# Patient Record
Sex: FEMALE | Race: BLACK OR AFRICAN AMERICAN | NOT HISPANIC OR LATINO | Employment: UNEMPLOYED | URBAN - METROPOLITAN AREA
[De-identification: names, ages, dates, MRNs, and addresses within clinical notes are randomized per-mention and may not be internally consistent; named-entity substitution may affect disease eponyms.]

---
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
## 2018-12-05 PROBLEM — O26.899 ABDOMINAL CRAMPING AFFECTING PREGNANCY: Status: ACTIVE | Noted: 2018-12-05

## 2018-12-05 PROBLEM — O21.9 NAUSEA AND VOMITING DURING PREGNANCY: Status: ACTIVE | Noted: 2018-12-05

## 2018-12-05 PROBLEM — R10.9 ABDOMINAL CRAMPING AFFECTING PREGNANCY: Status: ACTIVE | Noted: 2018-12-05

## 2018-12-05 PROBLEM — R10.9 ABDOMINAL PAIN: Status: ACTIVE | Noted: 2018-12-05

## 2019-05-11 PROBLEM — M54.9 BACK PAIN AFFECTING PREGNANCY: Status: ACTIVE | Noted: 2019-05-11

## 2019-05-11 PROBLEM — O99.891 BACK PAIN AFFECTING PREGNANCY: Status: ACTIVE | Noted: 2019-05-11

## 2019-07-11 PROBLEM — O99.013 ANEMIA COMPLICATING PREGNANCY IN THIRD TRIMESTER: Status: ACTIVE | Noted: 2019-07-11

## 2019-07-27 PROBLEM — O47.1 FALSE LABOR AFTER 37 COMPLETED WEEKS OF GESTATION: Status: ACTIVE | Noted: 2019-07-27

## 2019-07-30 PROBLEM — Z98.891 STATUS POST PRIMARY LOW TRANSVERSE CESAREAN SECTION: Status: ACTIVE | Noted: 2019-07-29

## 2019-10-07 PROBLEM — N32.89 BLADDER SPASMS: Status: ACTIVE | Noted: 2019-10-07

## 2019-10-07 PROBLEM — N88.8 MASS OF CERVIX: Status: ACTIVE | Noted: 2019-10-07

## 2022-09-21 ENCOUNTER — HOSPITAL ENCOUNTER (INPATIENT)
Facility: HOSPITAL | Age: 22
LOS: 4 days | Discharge: HOME | DRG: 759 | End: 2022-09-25
Attending: FAMILY MEDICINE | Admitting: OBSTETRICS & GYNECOLOGY
Payer: MEDICAID

## 2022-09-21 DIAGNOSIS — E87.6 HYPOKALEMIA: ICD-10-CM

## 2022-09-21 DIAGNOSIS — N73.9 PELVIC INFLAMMATORY DISEASE (PID): ICD-10-CM

## 2022-09-21 DIAGNOSIS — N73.0 PID (ACUTE PELVIC INFLAMMATORY DISEASE): ICD-10-CM

## 2022-09-21 DIAGNOSIS — N70.93 TUBO-OVARIAN ABSCESS: Primary | ICD-10-CM

## 2022-09-21 LAB
ALBUMIN SERPL-MCNC: 4.1 GM/DL (ref 3.5–5)
ALBUMIN/GLOB SERPL: 1 RATIO (ref 1.1–2)
ALP SERPL-CCNC: 67 UNIT/L (ref 40–150)
ALT SERPL-CCNC: 5 UNIT/L (ref 0–55)
APPEARANCE UR: CLEAR
AST SERPL-CCNC: 11 UNIT/L (ref 5–34)
B-HCG UR QL: NEGATIVE
BACTERIA #/AREA URNS AUTO: ABNORMAL /HPF
BASOPHILS # BLD AUTO: 0.04 X10(3)/MCL (ref 0–0.2)
BASOPHILS NFR BLD AUTO: 0.1 %
BILIRUB UR QL STRIP.AUTO: NEGATIVE MG/DL
BILIRUBIN DIRECT+TOT PNL SERPL-MCNC: 1.3 MG/DL
BUN SERPL-MCNC: 4.4 MG/DL (ref 7–18.7)
C TRACH DNA SPEC QL NAA+PROBE: NOT DETECTED
CALCIUM SERPL-MCNC: 10.2 MG/DL (ref 8.4–10.2)
CHLORIDE SERPL-SCNC: 98 MMOL/L (ref 98–107)
CLUE CELLS VAG QL WET PREP: ABNORMAL
CO2 SERPL-SCNC: 19 MMOL/L (ref 22–29)
COLOR UR AUTO: YELLOW
CREAT SERPL-MCNC: 0.71 MG/DL (ref 0.55–1.02)
CTP QC/QA: YES
EOSINOPHIL # BLD AUTO: 0 X10(3)/MCL (ref 0–0.9)
EOSINOPHIL NFR BLD AUTO: 0 %
ERYTHROCYTE [DISTWIDTH] IN BLOOD BY AUTOMATED COUNT: 13.9 % (ref 11.5–17)
GFR SERPLBLD CREATININE-BSD FMLA CKD-EPI: >60 MLS/MIN/1.73/M2
GLOBULIN SER-MCNC: 4.3 GM/DL (ref 2.4–3.5)
GLUCOSE SERPL-MCNC: 108 MG/DL (ref 74–100)
GLUCOSE UR QL STRIP.AUTO: NORMAL MG/DL
HCT VFR BLD AUTO: 33.6 % (ref 37–47)
HGB BLD-MCNC: 10.9 GM/DL (ref 12–16)
HYALINE CASTS #/AREA URNS LPF: ABNORMAL /LPF
IMM GRANULOCYTES # BLD AUTO: 0.84 X10(3)/MCL (ref 0–0.04)
IMM GRANULOCYTES NFR BLD AUTO: 2.9 %
KETONES UR QL STRIP.AUTO: ABNORMAL MG/DL
LACTATE SERPL-SCNC: 0.7 MMOL/L (ref 0.5–2.2)
LACTATE SERPL-SCNC: 1.1 MMOL/L (ref 0.5–2.2)
LEUKOCYTE ESTERASE UR QL STRIP.AUTO: 25 UNIT/L
LIPASE SERPL-CCNC: 5 U/L
LYMPHOCYTES # BLD AUTO: 1.39 X10(3)/MCL (ref 0.6–4.6)
LYMPHOCYTES NFR BLD AUTO: 4.8 %
MAGNESIUM SERPL-MCNC: 1.8 MG/DL (ref 1.6–2.6)
MCH RBC QN AUTO: 29.6 PG (ref 27–31)
MCHC RBC AUTO-ENTMCNC: 32.4 MG/DL (ref 33–36)
MCV RBC AUTO: 91.3 FL (ref 80–94)
MONOCYTES # BLD AUTO: 2.12 X10(3)/MCL (ref 0.1–1.3)
MONOCYTES NFR BLD AUTO: 7.3 %
MUCOUS THREADS URNS QL MICRO: ABNORMAL /LPF
N GONORRHOEA DNA SPEC QL NAA+PROBE: NOT DETECTED
NEUTROPHILS # BLD AUTO: 24.7 X10(3)/MCL (ref 2.1–9.2)
NEUTROPHILS NFR BLD AUTO: 84.9 %
NITRITE UR QL STRIP.AUTO: NEGATIVE
NRBC BLD AUTO-RTO: 0 %
PH UR STRIP.AUTO: 6 [PH]
PHOSPHATE SERPL-MCNC: 3.3 MG/DL (ref 2.3–4.7)
PLATELET # BLD AUTO: 276 X10(3)/MCL (ref 130–400)
PLATELET # BLD EST: ADEQUATE 10*3/UL
PLATELETS.RETICULATED NFR BLD AUTO: 9.7 % (ref 0.9–11.2)
PMV BLD AUTO: 12.4 FL (ref 7.4–10.4)
POTASSIUM SERPL-SCNC: 2.7 MMOL/L (ref 3.5–5.1)
PROT SERPL-MCNC: 8.4 GM/DL (ref 6.4–8.3)
PROT UR QL STRIP.AUTO: ABNORMAL MG/DL
RBC # BLD AUTO: 3.68 X10(6)/MCL (ref 4.2–5.4)
RBC #/AREA URNS AUTO: ABNORMAL /HPF
RBC MORPH BLD: NORMAL
RBC UR QL AUTO: NEGATIVE UNIT/L
SARS-COV-2 RDRP RESP QL NAA+PROBE: NEGATIVE
SODIUM SERPL-SCNC: 135 MMOL/L (ref 136–145)
SP GR UR STRIP.AUTO: 1.02
SQUAMOUS #/AREA URNS LPF: ABNORMAL /HPF
T VAGINALIS VAG QL WET PREP: ABNORMAL
UROBILINOGEN UR STRIP-ACNC: ABNORMAL MG/DL
WBC # SPEC AUTO: 29.1 X10(3)/MCL (ref 4.5–11.5)
WBC #/AREA URNS AUTO: ABNORMAL /HPF
WBC #/AREA VAG WET PREP: ABNORMAL
YEAST SPEC QL WET PREP: ABNORMAL

## 2022-09-21 PROCEDURE — 99285 EMERGENCY DEPT VISIT HI MDM: CPT | Mod: 25

## 2022-09-21 PROCEDURE — 96361 HYDRATE IV INFUSION ADD-ON: CPT

## 2022-09-21 PROCEDURE — 87491 CHLMYD TRACH DNA AMP PROBE: CPT | Performed by: FAMILY MEDICINE

## 2022-09-21 PROCEDURE — 87591 N.GONORRHOEAE DNA AMP PROB: CPT | Performed by: FAMILY MEDICINE

## 2022-09-21 PROCEDURE — 25000003 PHARM REV CODE 250: Performed by: STUDENT IN AN ORGANIZED HEALTH CARE EDUCATION/TRAINING PROGRAM

## 2022-09-21 PROCEDURE — 63600175 PHARM REV CODE 636 W HCPCS: Performed by: STUDENT IN AN ORGANIZED HEALTH CARE EDUCATION/TRAINING PROGRAM

## 2022-09-21 PROCEDURE — 36415 COLL VENOUS BLD VENIPUNCTURE: CPT | Performed by: FAMILY MEDICINE

## 2022-09-21 PROCEDURE — 63600175 PHARM REV CODE 636 W HCPCS: Performed by: FAMILY MEDICINE

## 2022-09-21 PROCEDURE — 85025 COMPLETE CBC W/AUTO DIFF WBC: CPT | Performed by: FAMILY MEDICINE

## 2022-09-21 PROCEDURE — 87040 BLOOD CULTURE FOR BACTERIA: CPT | Performed by: FAMILY MEDICINE

## 2022-09-21 PROCEDURE — 96365 THER/PROPH/DIAG IV INF INIT: CPT

## 2022-09-21 PROCEDURE — 83690 ASSAY OF LIPASE: CPT | Performed by: FAMILY MEDICINE

## 2022-09-21 PROCEDURE — 25000003 PHARM REV CODE 250: Performed by: FAMILY MEDICINE

## 2022-09-21 PROCEDURE — 87040 BLOOD CULTURE FOR BACTERIA: CPT | Performed by: STUDENT IN AN ORGANIZED HEALTH CARE EDUCATION/TRAINING PROGRAM

## 2022-09-21 PROCEDURE — 87635 SARS-COV-2 COVID-19 AMP PRB: CPT | Performed by: STUDENT IN AN ORGANIZED HEALTH CARE EDUCATION/TRAINING PROGRAM

## 2022-09-21 PROCEDURE — 21400001 HC TELEMETRY ROOM

## 2022-09-21 PROCEDURE — 83735 ASSAY OF MAGNESIUM: CPT | Performed by: FAMILY MEDICINE

## 2022-09-21 PROCEDURE — 25500020 PHARM REV CODE 255: Performed by: FAMILY MEDICINE

## 2022-09-21 PROCEDURE — 80053 COMPREHEN METABOLIC PANEL: CPT | Performed by: FAMILY MEDICINE

## 2022-09-21 PROCEDURE — 81025 URINE PREGNANCY TEST: CPT | Performed by: FAMILY MEDICINE

## 2022-09-21 PROCEDURE — 11000001 HC ACUTE MED/SURG PRIVATE ROOM

## 2022-09-21 PROCEDURE — 83605 ASSAY OF LACTIC ACID: CPT | Performed by: STUDENT IN AN ORGANIZED HEALTH CARE EDUCATION/TRAINING PROGRAM

## 2022-09-21 PROCEDURE — 87210 SMEAR WET MOUNT SALINE/INK: CPT | Performed by: FAMILY MEDICINE

## 2022-09-21 PROCEDURE — 81001 URINALYSIS AUTO W/SCOPE: CPT | Performed by: FAMILY MEDICINE

## 2022-09-21 PROCEDURE — 96375 TX/PRO/DX INJ NEW DRUG ADDON: CPT

## 2022-09-21 PROCEDURE — 84100 ASSAY OF PHOSPHORUS: CPT | Performed by: STUDENT IN AN ORGANIZED HEALTH CARE EDUCATION/TRAINING PROGRAM

## 2022-09-21 RX ORDER — METRONIDAZOLE 500 MG/1
500 TABLET ORAL EVERY 12 HOURS
Status: DISCONTINUED | OUTPATIENT
Start: 2022-09-21 | End: 2022-09-25 | Stop reason: HOSPADM

## 2022-09-21 RX ORDER — ACETAMINOPHEN 500 MG
1000 TABLET ORAL
Status: COMPLETED | OUTPATIENT
Start: 2022-09-21 | End: 2022-09-21

## 2022-09-21 RX ORDER — LANOLIN ALCOHOL/MO/W.PET/CERES
800 CREAM (GRAM) TOPICAL
Status: DISCONTINUED | OUTPATIENT
Start: 2022-09-21 | End: 2022-09-25 | Stop reason: HOSPADM

## 2022-09-21 RX ORDER — SODIUM CHLORIDE 0.9 % (FLUSH) 0.9 %
10 SYRINGE (ML) INJECTION
Status: DISCONTINUED | OUTPATIENT
Start: 2022-09-21 | End: 2022-09-25 | Stop reason: HOSPADM

## 2022-09-21 RX ORDER — POTASSIUM CHLORIDE 20 MEQ/1
40 TABLET, EXTENDED RELEASE ORAL
Status: COMPLETED | OUTPATIENT
Start: 2022-09-21 | End: 2022-09-21

## 2022-09-21 RX ORDER — ACETAMINOPHEN 325 MG/1
650 TABLET ORAL EVERY 6 HOURS
Status: DISCONTINUED | OUTPATIENT
Start: 2022-09-21 | End: 2022-09-21

## 2022-09-21 RX ORDER — KETOROLAC TROMETHAMINE 30 MG/ML
30 INJECTION, SOLUTION INTRAMUSCULAR; INTRAVENOUS
Status: COMPLETED | OUTPATIENT
Start: 2022-09-21 | End: 2022-09-21

## 2022-09-21 RX ORDER — KETOROLAC TROMETHAMINE 30 MG/ML
30 INJECTION, SOLUTION INTRAMUSCULAR; INTRAVENOUS EVERY 6 HOURS
Status: COMPLETED | OUTPATIENT
Start: 2022-09-21 | End: 2022-09-22

## 2022-09-21 RX ORDER — PROMETHAZINE HYDROCHLORIDE 25 MG/1
25 TABLET ORAL EVERY 6 HOURS PRN
Status: DISCONTINUED | OUTPATIENT
Start: 2022-09-21 | End: 2022-09-25 | Stop reason: HOSPADM

## 2022-09-21 RX ORDER — IBUPROFEN 200 MG
24 TABLET ORAL
Status: DISCONTINUED | OUTPATIENT
Start: 2022-09-21 | End: 2022-09-25 | Stop reason: HOSPADM

## 2022-09-21 RX ORDER — OXYCODONE HYDROCHLORIDE 5 MG/1
5 TABLET ORAL EVERY 6 HOURS PRN
Status: DISCONTINUED | OUTPATIENT
Start: 2022-09-21 | End: 2022-09-25 | Stop reason: HOSPADM

## 2022-09-21 RX ORDER — ONDANSETRON 4 MG/1
8 TABLET, ORALLY DISINTEGRATING ORAL EVERY 8 HOURS PRN
Status: DISCONTINUED | OUTPATIENT
Start: 2022-09-21 | End: 2022-09-25 | Stop reason: HOSPADM

## 2022-09-21 RX ORDER — POTASSIUM CHLORIDE 20 MEQ/1
40 TABLET, EXTENDED RELEASE ORAL 2 TIMES DAILY
Status: DISCONTINUED | OUTPATIENT
Start: 2022-09-21 | End: 2022-09-24

## 2022-09-21 RX ORDER — GLUCAGON 1 MG
1 KIT INJECTION
Status: DISCONTINUED | OUTPATIENT
Start: 2022-09-21 | End: 2022-09-25 | Stop reason: HOSPADM

## 2022-09-21 RX ORDER — IBUPROFEN 200 MG
16 TABLET ORAL
Status: DISCONTINUED | OUTPATIENT
Start: 2022-09-21 | End: 2022-09-25 | Stop reason: HOSPADM

## 2022-09-21 RX ORDER — OXYCODONE HYDROCHLORIDE 5 MG/1
10 TABLET ORAL EVERY 6 HOURS PRN
Status: DISCONTINUED | OUTPATIENT
Start: 2022-09-21 | End: 2022-09-25 | Stop reason: HOSPADM

## 2022-09-21 RX ORDER — SODIUM CHLORIDE, SODIUM LACTATE, POTASSIUM CHLORIDE, CALCIUM CHLORIDE 600; 310; 30; 20 MG/100ML; MG/100ML; MG/100ML; MG/100ML
INJECTION, SOLUTION INTRAVENOUS CONTINUOUS
Status: DISCONTINUED | OUTPATIENT
Start: 2022-09-21 | End: 2022-09-24

## 2022-09-21 RX ORDER — ONDANSETRON 2 MG/ML
4 INJECTION INTRAMUSCULAR; INTRAVENOUS
Status: COMPLETED | OUTPATIENT
Start: 2022-09-21 | End: 2022-09-21

## 2022-09-21 RX ORDER — POTASSIUM CHLORIDE 20 MEQ/1
40 TABLET, EXTENDED RELEASE ORAL 2 TIMES DAILY
Status: DISCONTINUED | OUTPATIENT
Start: 2022-09-21 | End: 2022-09-21

## 2022-09-21 RX ORDER — PROCHLORPERAZINE EDISYLATE 5 MG/ML
5 INJECTION INTRAMUSCULAR; INTRAVENOUS EVERY 6 HOURS PRN
Status: DISCONTINUED | OUTPATIENT
Start: 2022-09-21 | End: 2022-09-21

## 2022-09-21 RX ORDER — ACETAMINOPHEN 325 MG/1
650 TABLET ORAL EVERY 4 HOURS PRN
Status: DISCONTINUED | OUTPATIENT
Start: 2022-09-21 | End: 2022-09-25 | Stop reason: HOSPADM

## 2022-09-21 RX ORDER — POLYETHYLENE GLYCOL 3350 17 G/17G
17 POWDER, FOR SOLUTION ORAL DAILY
Status: DISCONTINUED | OUTPATIENT
Start: 2022-09-21 | End: 2022-09-25 | Stop reason: HOSPADM

## 2022-09-21 RX ORDER — ONDANSETRON 4 MG/1
8 TABLET, ORALLY DISINTEGRATING ORAL EVERY 8 HOURS PRN
Status: DISCONTINUED | OUTPATIENT
Start: 2022-09-21 | End: 2022-09-21

## 2022-09-21 RX ORDER — IBUPROFEN 600 MG/1
600 TABLET ORAL EVERY 6 HOURS
Status: DISCONTINUED | OUTPATIENT
Start: 2022-09-22 | End: 2022-09-25 | Stop reason: HOSPADM

## 2022-09-21 RX ADMIN — ACETAMINOPHEN 1000 MG: 500 TABLET ORAL at 10:09

## 2022-09-21 RX ADMIN — CEFTRIAXONE SODIUM 1 G: 1 INJECTION, POWDER, FOR SOLUTION INTRAMUSCULAR; INTRAVENOUS at 08:09

## 2022-09-21 RX ADMIN — SODIUM CHLORIDE 1000 ML: 9 INJECTION, SOLUTION INTRAVENOUS at 03:09

## 2022-09-21 RX ADMIN — KETOROLAC TROMETHAMINE 30 MG: 30 INJECTION, SOLUTION INTRAMUSCULAR; INTRAVENOUS at 11:09

## 2022-09-21 RX ADMIN — KETOROLAC TROMETHAMINE 30 MG: 30 INJECTION, SOLUTION INTRAMUSCULAR; INTRAVENOUS at 03:09

## 2022-09-21 RX ADMIN — SODIUM CHLORIDE, POTASSIUM CHLORIDE, SODIUM LACTATE AND CALCIUM CHLORIDE: 600; 310; 30; 20 INJECTION, SOLUTION INTRAVENOUS at 12:09

## 2022-09-21 RX ADMIN — KETOROLAC TROMETHAMINE 30 MG: 30 INJECTION, SOLUTION INTRAMUSCULAR; INTRAVENOUS at 05:09

## 2022-09-21 RX ADMIN — POTASSIUM CHLORIDE 40 MEQ: 1500 TABLET, EXTENDED RELEASE ORAL at 06:09

## 2022-09-21 RX ADMIN — ONDANSETRON 8 MG: 4 TABLET, ORALLY DISINTEGRATING ORAL at 10:09

## 2022-09-21 RX ADMIN — METRONIDAZOLE 500 MG: 500 TABLET ORAL at 09:09

## 2022-09-21 RX ADMIN — SODIUM CHLORIDE, POTASSIUM CHLORIDE, SODIUM LACTATE AND CALCIUM CHLORIDE 1632 ML: 600; 310; 30; 20 INJECTION, SOLUTION INTRAVENOUS at 11:09

## 2022-09-21 RX ADMIN — IOHEXOL 100 ML: 350 INJECTION, SOLUTION INTRAVENOUS at 05:09

## 2022-09-21 RX ADMIN — PIPERACILLIN AND TAZOBACTAM 4.5 G: 4; .5 INJECTION, POWDER, LYOPHILIZED, FOR SOLUTION INTRAVENOUS; PARENTERAL at 06:09

## 2022-09-21 RX ADMIN — POTASSIUM CHLORIDE 40 MEQ: 1500 TABLET, EXTENDED RELEASE ORAL at 04:09

## 2022-09-21 RX ADMIN — SODIUM CHLORIDE, POTASSIUM CHLORIDE, SODIUM LACTATE AND CALCIUM CHLORIDE 1632 ML: 600; 310; 30; 20 INJECTION, SOLUTION INTRAVENOUS at 09:09

## 2022-09-21 RX ADMIN — POLYETHYLENE GLYCOL 3350 17 G: 17 POWDER, FOR SOLUTION ORAL at 09:09

## 2022-09-21 RX ADMIN — VANCOMYCIN HYDROCHLORIDE 1000 MG: 1 INJECTION, POWDER, LYOPHILIZED, FOR SOLUTION INTRAVENOUS at 11:09

## 2022-09-21 RX ADMIN — ONDANSETRON 4 MG: 2 INJECTION INTRAMUSCULAR; INTRAVENOUS at 03:09

## 2022-09-21 RX ADMIN — PIPERACILLIN AND TAZOBACTAM 4.5 G: 4; .5 INJECTION, POWDER, LYOPHILIZED, FOR SOLUTION INTRAVENOUS; PARENTERAL at 10:09

## 2022-09-21 RX ADMIN — VANCOMYCIN HYDROCHLORIDE 750 MG: 750 INJECTION, POWDER, LYOPHILIZED, FOR SOLUTION INTRAVENOUS at 11:09

## 2022-09-21 NOTE — PROGRESS NOTES
MD to bedside for PM rounds.    Pt doing well, pain improved, denies n/v, fevers or chills. Tolerating PO     Vitals:    09/21/22 1546   BP:    Pulse: 97   Resp:    Temp:        Gen: NAD  Lungs: normal resp effort  Abdomen: soft, mild ttp in bilateral lower quadrants, no rebound or guarding  Ext: calves nontender    A/P:  Beatriz Arceo is a 22 y.o. female admitted for  PID/left TOA. Currently on IV antibiotics    PID/TOA:   - wet prep significant for trichomonas, plan to start PO flagyl for treatment   -T last 100.4F at approx. 1130. Has been afebrile since that time. Tylenol prn for fever  -Continue IV vanc/zosyn   - lactic acid wnl, blood cultures pending   - NPO after midnight in the event of IR consult in AM and potential IR drainage of TOA.       Hypokalemia: repleting with Jean Carlos, will f/u AM CBC    Dionte Stewart MD   [facility], PGY4

## 2022-09-21 NOTE — PROGRESS NOTES
Pharmacokinetic Initial Assessment: IV Vancomycin    Assessment/Plan:    Initiate intravenous vancomycin with loading dose of 1000 mg once followed by a maintenance dose of vancomycin 750mg IV every 12 hours  Desired empiric serum trough concentration is 10 to 20 mcg/mL  Draw vancomycin random level on 09/22/22 at 1100.  Pharmacy will continue to follow and monitor vancomycin.      Please contact pharmacy at extension [phone] with any questions regarding this assessment.     Thank you for the consult,   Sasha Do       Patient brief summary:  Beatriz Arceo is a 22 y.o. female initiated on antimicrobial therapy with IV Vancomycin for treatment of suspected intra-abdominal infection    Drug Allergies:   Review of patient's allergies indicates:  No Known Allergies    Actual Body Weight:   54.4kg    Renal Function:   Estimated Creatinine Clearance: 87.8 mL/min (based on SCr of 0.71 mg/dL).,     CBC (last 72 hours):  Recent Labs   Lab Result Units 09/21/22  0409   WBC x10(3)/mcL 29.1*   Hgb gm/dL 10.9*   Hct % 33.6*   Platelet x10(3)/mcL 276   Mono % % 7.3   Eos % % 0.0   Basophil % % 0.1       Metabolic Panel (last 72 hours):  Recent Labs   Lab Result Units 09/21/22  0329 09/21/22  0409 09/21/22  0529   Sodium Level mmol/L  --  135*  --    Potassium Level mmol/L  --  2.7*  --    Chloride mmol/L  --  98  --    Carbon Dioxide mmol/L  --  19*  --    Glucose Level mg/dL  --  108*  --    Glucose, UA mg/dL Normal  --   --    Blood Urea Nitrogen mg/dL  --  4.4*  --    Creatinine mg/dL  --  0.71  --    Albumin Level gm/dL  --  4.1  --    Bilirubin Total mg/dL  --  1.3  --    Alkaline Phosphatase unit/L  --  67  --    Aspartate Aminotransferase unit/L  --  11  --    Alanine Aminotransferase unit/L  --  5  --    Magnesium Level mg/dL  --   --  1.80     Microbiologic Results:  Microbiology Results (last 7 days)       Procedure Component Value Units Date/Time    Blood Culture [[id number]] Collected: 09/21/22 0957    Order  Status: Sent Specimen: Blood Updated: 09/21/22 1013    Blood Culture [[id number]]     Order Status: Sent Specimen: Blood     Chlamydia/GC, PCR [[id number]]  (Normal) Collected: 09/21/22 0329    Order Status: Completed Specimen: Urine Updated: 09/21/22 0918     Chlamydia trachomatis PCR Not Detected     N. gonorrhea PCR Not Detected    Blood Culture [[id number]] Collected: 09/21/22 0817    Order Status: Sent Specimen: Blood Updated: 09/21/22 0822    Blood Culture [[id number]] Collected: 09/21/22 0817    Order Status: Sent Specimen: Blood Updated: 09/21/22 0822    Wet Prep, Genital [[id number]]  (Abnormal) Collected: 09/21/22 0741    Order Status: Completed Specimen: Genital from Cervicovaginal Updated: 09/21/22 0751     WBC, Wet Prep Many     Clue Cells, Wet Prep None Seen     Trichomonas, Wet Prep Few     Yeast, Wet Prep None Seen    Trichomonas Prep Wet Mount [[id number]]     Order Status: Canceled Specimen: Vagina

## 2022-09-21 NOTE — CONSULTS
[facility] Consult H&P     Subjective:      History of Present Illness:  Beatriz Arceo is a 22 y.o.  presenting to ED with 4 days N/V and abdominal pain, constipation. Pt states pain is sharp, located at bilateral lower abdominal quadrants, worse when getting up after being seated for long time. Denies CP, SOB, fevers or chills. Denies vaginal bleeding or abnormal discharge.        Review of Systems:  Pertinent items are noted in HPI. All other systems are reviewed and are negative.    Past Medical History:  Past Medical History:   Diagnosis Date    Cancer     Cervical cancer        Past Surgical History:  Past Surgical History:   Procedure Laterality Date     SECTION N/A 2019    Procedure:  SECTION;  Surgeon: Fred Reynoso MD;  Location: [facility] OR;  Service: OB/GYN;  Laterality: N/A;    froze cervical cancer         Obstetrical History:  OB History    Para Term  AB Living   2 2 2     2   SAB IAB Ectopic Multiple Live Births           2      # Outcome Date GA Lbr Corey/2nd Weight Sex Delivery Anes PTL Lv   2 Term 17 39w3d  3.45 kg (7 lb 9.7 oz) F CS-LTranv EPI N JESUS      Complications: Chorioamnionitis, Failed induction of labor   1 Term        Y JESUS       Gynecologic History:   LMP 2022  Menarche: 11/r/3-5  STD history: remote hx trichomonas  Pap smear history: hx cryotherapy x2 2 years ago, does not recall pathology    Allergies:  Review of patient's allergies indicates:  No Known Allergies    Medications:   In-Hospital Scheduled Medications:   cefTRIAXone (ROCEPHIN) IVPB  1 g Intravenous ED 1 Time      In-Hospital PRN Medications:     In-Hospital IV Infusion Medications:     Home Medications:  Prior to Admission medications    Medication Sig Start Date End Date Taking? Authorizing Provider   diclofenac (VOLTAREN) 75 MG EC tablet Take 1 tablet (75 mg total) by mouth 2 (two) times daily as needed (Pain). 10/7/19   Abel Ahn NP    HYDROcodone-acetaminophen (NORCO) 5-325 mg per tablet Take 1 tablet by mouth every 6 (six) hours as needed for Pain. 10/7/19   Abel Ahn NP   hyoscyamine (LEVSIN/SL) 0.125 mg Subl Place 1 tablet (0.125 mg total) under the tongue every 4 (four) hours as needed (Abdominal cramping/bladder spasms). 10/7/19   Abel Ahn NP       Family History:  History reviewed. No pertinent family history.    Social History:  Social History     Tobacco Use    Smoking status: Former    Smokeless tobacco: Never   Substance Use Topics    Alcohol use: No    Drug use: No        Objective:   Last 24 Hour Vital Signs:  BP  Min: 124/71  Max: 131/77  Temp  Av.7 °F (37.6 °C)  Min: 99.7 °F (37.6 °C)  Max: 99.7 °F (37.6 °C)  Pulse  Av.3  Min: 95  Max: 106  Resp  Av.5  Min: 17  Max: 18  SpO2  Av %  Min: 98 %  Max: 100 %  I/O last 3 completed shifts:  In: 1000 [IV Piggyback:1000]  Out: -   There is no height or weight on file to calculate BMI.    Physical Examination:  Vitals:    22 0302 22 0530 22 0600 22 0630   BP:  124/71 129/74 131/77   Pulse: 106 99 95 101   Resp: 18 18 17 17   Temp: 99.7 °F (37.6 °C)      TempSrc: Oral      SpO2: 98% 99% 100% 99%       There is no height or weight on file to calculate BMI.    Gen: Alert, cooperative, no distress, appears stated age  CV: RRR  Chest: CTABL, no wheezes/rales/rhonchi  Abdomen: Soft, mild TTP b/l lower quadrants, no masses  Extrem: Extremities normal, atraumatic, no cyanosis or edema.  No calf tenderness or erythema.  External genitalia: Normal female genetalia without lesion, discharge or tenderness.  Speculum Exam: Vaginal vault with scant white discharge, nonodorous, no lesions/masses seen.  Cervical os visualized as parous, closed, no lesions/masses.  Bimanual Exam: No cervical motion tenderness.  Uterus freely mobile.  6-7 week size uterus. No adnexal masses.  TTP right and left adnexa, fundus    Of note; per ED physician pelvic exam  prior to this MD notable for copious foul smelling yellow/green discharge cleared with 4 proctoswabs    Laboratory Results:  Lab Results   Component Value Date    WBC 29.1 (H) 09/21/2022    HGB 10.9 (L) 09/21/2022    HCT 33.6 (L) 09/21/2022    MCV 91.3 09/21/2022     09/21/2022         Radiology:  TVUS 9/21/22:   FINDINGS:  The uterus measures 12.5 x 4.2 x 5.3 cm. The endometrial stripe is 1 cm.     The right ovary is only seen transabdominally. It measures 4.1 x 2 x 2.9 cm. There is a cyst present measuring 1.8 x 1.2 x 2 cm. There is flow present.     There are nabothian cyst present.  There is a small amount of free fluid in the cul de sac.     The left ovary measures 7.3 x 4.6 x 6.2 cm..  This is felt to represent both the ovary and a complex fluid collection adjacent to the ovary.  The 2 are very difficult to separate.  This is most consistent with a hemorrhagic or infected cyst.  An endometrioma cannot be ruled out with certainty.  The flow is hyperemic.     Impression:     Complex lesion adjacent to the left ovary.  Differential would include hemorrhagic and infected cyst.  Endometrioma cannot be ruled out with certainty.  TOA should be considered.       CT Abdomen Pelvis With Contrast    Result Date: 9/21/2022  START OF REPORT: Technique: CT of the abdomen and pelvis was performed with axial images as well as sagittal and coronal reconstruction images with intravenous contrast. Comparison: Comparison is with study dated â2019-10-07 01:49:28â. Clinical History: Abdominal Pain Vomiting Nausea. Dosage Information: Automated Exposure Control was utilized. Findings: Lines and Tubes: None. Thorax: Lungs: The visualized lung bases appear unremarkable. Pleura: No effusions or thickening. Heart: The heart size is within normal limits. Abdomen: Abdominal Wall: No abdominal wall pathology is seen. Liver: The liver appears unremarkable. Biliary System: No intrahepatic or extrahepatic biliary duct dilatation is  seen. Gallbladder: The gallbladder appears unremarkable. Pancreas: The pancreas appears unremarkable. Spleen: The spleen appears unremarkable. Adrenals: The adrenal glands appear unremarkable. Kidneys: The kidneys appear unremarkable with no stones cysts masses or hydronephrosis. Aorta: The abdominal aorta appears unremarkable. IVC: Unremarkable. Bowel: Esophagus: The visualized esophagus appears unremarkable. Stomach: The stomach appears unremarkable. Duodenum: Unremarkable appearing duodenum. Small Bowel: The small bowel appears unremarkable. Colon: The wall of rectum seems thick (series 607 image 67). Consider proctitis. Correlation and follow up advised as clinically indicated. Appendix: The appendix is not identified due to overlapping by the bowel loops but no inflammatory changes are seen in the right lower quadrant to suggest appendicitis. Peritoneum: Mild free fluid is seen in the pelvis. Pelvis: Bladder: The bladder is nondistended however the bladder wall appears thickened after considering state of nondistension which could reflect an element of cystitis. Female: Uterus: The cervix seems irregularly thick. Visual inspection advised. Ovaries: There are complex cystic masses in bilateral adnexa measuring 3.0 x 3.0 cm on right and 7.0 x 4.7 cm on left.Mild surrounding peripelvic fat stranding and free fluid in the pelvis. Bilateral ovaries are not seperately visualised. There may be elements of thickened small bowel insinuated into the left adnexa. Prominent parametrial veins are noted bilaterally. Considerations include endometriomas, and neoplasm, less likely tubo-ovarian abscesses at this age. Suggest pelvic ultrasound evaluation. Bony structures: Dorsal Spine: The visualized dorsal spine appears unremarkable. Bony Pelvis: The visualized bony structures of the pelvis appear unremarkable. Impression: 1. There are complex cystic masses in bilateral adnexa measuring 3.0 x 3.0 cm on right and 7.0 x 4.7 cm on  left.Mild surrounding peripelvic fat stranding and free fluid in the pelvis. Bilateral ovaries are not seperately visualised. There may be elements of thickened small bowel insinuated into the left adnexa. Prominent parametrial veins are noted bilaterally. Considerations include endometriomas, and neoplasm, less likely tubo-ovarian abscesses at this age. Suggest pelvic ultrasound evaluation. 2. The cervix seems irregularly thick. Visual inspection advised. 3. The bladder is nondistended however the bladder wall appears thickened after considering state of nondistension which could reflect an element of cystitis. 4. The wall of rectum seems thick (series 607 image 67). Consider proctitis. Correlation and follow up advised as clinically indicated. 5. Details and other findings as discussed above.        Assessment/Plan:     Beatriz Arceo is a 22 y.o. female with 7cm left adnexal mass, b/l adnexal tenderness and copious malodorous discharge concerning for PID/TOA. Leukocytosis at 29.   -PID/TOA:   - will treat with vanc/zosyn given intraabdominal infection  - Consult IR for possible drainage if no improvement on IV abx  - sepsis protocol ordered; will follow up lactate, blood, urine cultures  - continue IV fluid hydration with LR @ 125ml/hr  - regular diet now, low threshold for NPO  - if no clinical improvement or worsens, IR consult; if cannot be drained plan for dx lap with washout       Discussed with staff, Dr. Provost Magi Mcelroy MD, MPH  [facility], PGY3

## 2022-09-21 NOTE — ED PROVIDER NOTES
Encounter Date: 2022       History     Chief Complaint   Patient presents with    Abdominal Pain    Vomiting    Nausea     22-year-old female presents emergency room with complaints of lower abdominal pain for the last 4 days.  Patient thought that it was initially menstrual cramps, but then felt as if she was constipated.  No bowel movement the last 4 days.  Reports associated nausea and vomiting.  Patient denies dysuria or hematuria.  Denies vaginal bleeding.  Reports last menstrual cycle was approximately a month ago, but does not believe she was pregnant.  History of cervical cancer in the past.  Symptoms currently severe, worse with certain movements, nothing makes better.    The history is provided by the patient.   Review of patient's allergies indicates:  No Known Allergies  Past Medical History:   Diagnosis Date    Cancer     Cervical cancer      Past Surgical History:   Procedure Laterality Date     SECTION N/A 2019    Procedure:  SECTION;  Surgeon: Fred Reynoso MD;  Location: [facility];  Service: OB/GYN;  Laterality: N/A;    froze cervical cancer       History reviewed. No pertinent family history.  Social History     Tobacco Use    Smoking status: Former    Smokeless tobacco: Never   Substance Use Topics    Alcohol use: No    Drug use: No     Review of Systems   Constitutional:  Negative for chills, fatigue and fever.   HENT:  Negative for ear pain, rhinorrhea and sore throat.    Eyes:  Negative for photophobia and pain.   Respiratory:  Negative for cough, shortness of breath and wheezing.    Cardiovascular:  Negative for chest pain.   Gastrointestinal:  Positive for abdominal pain, constipation, nausea and vomiting. Negative for diarrhea.   Genitourinary:  Negative for dysuria.   Neurological:  Negative for dizziness, weakness and headaches.   All other systems reviewed and are negative.    Physical Exam     Initial Vitals   BP Pulse Resp Temp SpO2   22 0530  09/21/22 0302 09/21/22 0302 09/21/22 0302 09/21/22 0302   124/71 106 18 99.7 °F (37.6 °C) 98 %      MAP       --                Physical Exam    Nursing note and vitals reviewed.  Constitutional: She appears well-developed and well-nourished. No distress.   Appears uncomfortable due to pain   HENT:   Head: Normocephalic and atraumatic.   Eyes: Conjunctivae and EOM are normal. Pupils are equal, round, and reactive to light.   Neck: Neck supple.   Normal range of motion.  Cardiovascular:  Normal rate, regular rhythm, normal heart sounds and intact distal pulses.           Pulmonary/Chest: Breath sounds normal. No respiratory distress. She has no wheezes. She has no rhonchi. She has no rales.   Abdominal: Abdomen is soft. Bowel sounds are normal. She exhibits no distension. There is abdominal tenderness.   Moderate generalized abdominal tenderness without rebound or guarding; abdomen slightly distended. There is no rebound and no guarding.   Musculoskeletal:         General: Normal range of motion.      Cervical back: Normal range of motion and neck supple.     Neurological: She is alert and oriented to person, place, and time.   Skin: Skin is warm and dry. Capillary refill takes less than 2 seconds. No erythema.   Psychiatric: She has a normal mood and affect. Her behavior is normal. Judgment and thought content normal.       ED Course   Procedures  Labs Reviewed   COMPREHENSIVE METABOLIC PANEL - Abnormal; Notable for the following components:       Result Value    Sodium Level 135 (*)     Potassium Level 2.7 (*)     Carbon Dioxide 19 (*)     Glucose Level 108 (*)     Blood Urea Nitrogen 4.4 (*)     Protein Total 8.4 (*)     Globulin 4.3 (*)     Albumin/Globulin Ratio 1.0 (*)     All other components within normal limits   URINALYSIS, REFLEX TO URINE CULTURE - Abnormal; Notable for the following components:    Protein, UA 1+ (*)     Ketones, UA 4+ (*)     Urobilinogen, UA 2+ (*)     Leukocyte Esterase, UA 25 (*)      Bacteria, UA Few (*)     Squamous Epithelial Cells, UA Few (*)     Mucous, UA Occ (*)     Hyaline Casts, UA 0-2 (*)     All other components within normal limits   CBC WITH DIFFERENTIAL - Abnormal; Notable for the following components:    WBC 29.1 (*)     RBC 3.68 (*)     Hgb 10.9 (*)     Hct 33.6 (*)     MCHC 32.4 (*)     MPV 12.4 (*)     Neut # 24.7 (*)     Mono # 2.12 (*)     IG# 0.84 (*)     All other components within normal limits    Narrative:     This is an appended report.  These results have been appended to a previously verified report.   LIPASE - Normal   BLOOD SMEAR MICROSCOPIC EXAM ([facility]) - Normal   MAGNESIUM - Normal   CHLAMYDIA/GONORRHOEAE(GC), PCR   CBC W/ AUTO DIFFERENTIAL    Narrative:     The following orders were created for panel order CBC Auto Differential.  Procedure                               Abnormality         Status                     ---------                               -----------         ------                     CBC with Differential[[id number]]        Abnormal            Final result                 Please view results for these tests on the individual orders.   POCT URINE PREGNANCY          Imaging Results              CT Abdomen Pelvis With Contrast (Preliminary result)  Result time 09/21/22 05:25:13      Preliminary result by Derik Fontana Jr., MD (09/21/22 05:25:13)                   Narrative:    START OF REPORT:  Technique: CT of the abdomen and pelvis was performed with axial images as well as sagittal and coronal reconstruction images with intravenous contrast.    Comparison: Comparison is with study dated â2019-10-07 01:49:28â.    Clinical History: Abdominal Pain Vomiting Nausea.    Dosage Information: Automated Exposure Control was utilized.    Findings:  Lines and Tubes: None.  Thorax:  Lungs: The visualized lung bases appear unremarkable.  Pleura: No effusions or thickening.  Heart: The heart size is within normal limits.  Abdomen:  Abdominal Wall: No  abdominal wall pathology is seen.  Liver: The liver appears unremarkable.  Biliary System: No intrahepatic or extrahepatic biliary duct dilatation is seen.  Gallbladder: The gallbladder appears unremarkable.  Pancreas: The pancreas appears unremarkable.  Spleen: The spleen appears unremarkable.  Adrenals: The adrenal glands appear unremarkable.  Kidneys: The kidneys appear unremarkable with no stones cysts masses or hydronephrosis.  Aorta: The abdominal aorta appears unremarkable.  IVC: Unremarkable.  Bowel:  Esophagus: The visualized esophagus appears unremarkable.  Stomach: The stomach appears unremarkable.  Duodenum: Unremarkable appearing duodenum.  Small Bowel: The small bowel appears unremarkable.  Colon: The wall of rectum seems thick (series 607 image 67). Consider proctitis. Correlation and follow up advised as clinically indicated.  Appendix: The appendix is not identified due to overlapping by the bowel loops but no inflammatory changes are seen in the right lower quadrant to suggest appendicitis.  Peritoneum: Mild free fluid is seen in the pelvis.    Pelvis:  Bladder: The bladder is nondistended however the bladder wall appears thickened after considering state of nondistension which could reflect an element of cystitis.  Female:  Uterus: The cervix seems irregularly thick. Visual inspection advised.  Ovaries: There are complex cystic masses in bilateral adnexa measuring 3.0 x 3.0 cm on right and 7.0 x 4.7 cm on left.Mild surrounding peripelvic fat stranding and free fluid in the pelvis. Bilateral ovaries are not seperately visualised. There may be elements of thickened small bowel insinuated into the left adnexa. Prominent parametrial veins are noted bilaterally. Considerations include endometriomas, and neoplasm, less likely tubo-ovarian abscesses at this age. Suggest pelvic ultrasound evaluation.    Bony structures:  Dorsal Spine: The visualized dorsal spine appears unremarkable.  Bony Pelvis: The  visualized bony structures of the pelvis appear unremarkable.      Impression:  1. There are complex cystic masses in bilateral adnexa measuring 3.0 x 3.0 cm on right and 7.0 x 4.7 cm on left.Mild surrounding peripelvic fat stranding and free fluid in the pelvis. Bilateral ovaries are not seperately visualised. There may be elements of thickened small bowel insinuated into the left adnexa. Prominent parametrial veins are noted bilaterally. Considerations include endometriomas, and neoplasm, less likely tubo-ovarian abscesses at this age. Suggest pelvic ultrasound evaluation.  2. The cervix seems irregularly thick. Visual inspection advised.  3. The bladder is nondistended however the bladder wall appears thickened after considering state of nondistension which could reflect an element of cystitis.  4. The wall of rectum seems thick (series 607 image 67). Consider proctitis. Correlation and follow up advised as clinically indicated.  5. Details and other findings as discussed above.                          Preliminary result by Interface, Rad Results In (09/21/22 05:25:13)                   Narrative:    START OF REPORT:  Technique: CT of the abdomen and pelvis was performed with axial images as well as sagittal and coronal reconstruction images with intravenous contrast.    Comparison: Comparison is with study dated â2019-10-07 01:49:28â.    Clinical History: Abdominal Pain Vomiting Nausea.    Dosage Information: Automated Exposure Control was utilized.    Findings:  Lines and Tubes: None.  Thorax:  Lungs: The visualized lung bases appear unremarkable.  Pleura: No effusions or thickening.  Heart: The heart size is within normal limits.  Abdomen:  Abdominal Wall: No abdominal wall pathology is seen.  Liver: The liver appears unremarkable.  Biliary System: No intrahepatic or extrahepatic biliary duct dilatation is seen.  Gallbladder: The gallbladder appears unremarkable.  Pancreas: The pancreas appears  unremarkable.  Spleen: The spleen appears unremarkable.  Adrenals: The adrenal glands appear unremarkable.  Kidneys: The kidneys appear unremarkable with no stones cysts masses or hydronephrosis.  Aorta: The abdominal aorta appears unremarkable.  IVC: Unremarkable.  Bowel:  Esophagus: The visualized esophagus appears unremarkable.  Stomach: The stomach appears unremarkable.  Duodenum: Unremarkable appearing duodenum.  Small Bowel: The small bowel appears unremarkable.  Colon: The wall of rectum seems thick (series 607 image 67). Consider proctitis. Correlation and follow up advised as clinically indicated.  Appendix: The appendix is not identified due to overlapping by the bowel loops but no inflammatory changes are seen in the right lower quadrant to suggest appendicitis.  Peritoneum: Mild free fluid is seen in the pelvis.    Pelvis:  Bladder: The bladder is nondistended however the bladder wall appears thickened after considering state of nondistension which could reflect an element of cystitis.  Female:  Uterus: The cervix seems irregularly thick. Visual inspection advised.  Ovaries: There are complex cystic masses in bilateral adnexa measuring 3.0 x 3.0 cm on right and 7.0 x 4.7 cm on left.Mild surrounding peripelvic fat stranding and free fluid in the pelvis. Bilateral ovaries are not seperately visualised. There may be elements of thickened small bowel insinuated into the left adnexa. Prominent parametrial veins are noted bilaterally. Considerations include endometriomas, and neoplasm, less likely tubo-ovarian abscesses at this age. Suggest pelvic ultrasound evaluation.    Bony structures:  Dorsal Spine: The visualized dorsal spine appears unremarkable.  Bony Pelvis: The visualized bony structures of the pelvis appear unremarkable.      Impression:  1. There are complex cystic masses in bilateral adnexa measuring 3.0 x 3.0 cm on right and 7.0 x 4.7 cm on left.Mild surrounding peripelvic fat stranding and free  fluid in the pelvis. Bilateral ovaries are not seperately visualised. There may be elements of thickened small bowel insinuated into the left adnexa. Prominent parametrial veins are noted bilaterally. Considerations include endometriomas, and neoplasm, less likely tubo-ovarian abscesses at this age. Suggest pelvic ultrasound evaluation.  2. The cervix seems irregularly thick. Visual inspection advised.  3. The bladder is nondistended however the bladder wall appears thickened after considering state of nondistension which could reflect an element of cystitis.  4. The wall of rectum seems thick (series 607 image 67). Consider proctitis. Correlation and follow up advised as clinically indicated.  5. Details and other findings as discussed above.                                         Medications   cefTRIAXone (ROCEPHIN) 1 g in sodium chloride 0.9 % 50 mL IVPB (MB+) (has no administration in time range)   ondansetron injection 4 mg (4 mg Intravenous Given 9/21/22 0329)   ketorolac injection 30 mg (30 mg Intravenous Given 9/21/22 0329)   sodium chloride 0.9% bolus 1,000 mL (0 mLs Intravenous Stopped 9/21/22 0513)   iohexoL (OMNIPAQUE 350) injection 100 mL (100 mLs Intravenous Given 9/21/22 0527)   potassium chloride SA CR tablet 40 mEq (40 mEq Oral Given 9/21/22 0633)                 ED Course as of 09/21/22 0704   Wed Sep 21, 2022   0429 Feeling improved since receiving Toradol.  Awaiting on laboratory evaluation. [MW]   0441 WBC(!): 29.1 [MW]   0441 Hemoglobin(!): 10.9 [MW]   0441 Hematocrit(!): 33.6 [MW]   0441 Platelets: 276 [MW]   0441 Color, UA: Yellow [MW]   0441 Appearance, UA: Clear [MW]   0441 Leukocytes, UA(!): 25 [MW]   0441 NITRITE UA: Negative [MW]   0441 WBC, UA: 0-5 [MW]   0441 Bacteria, UA(!): Few [MW]   0442 Lipase: 5 [MW]   0528 Sodium(!): 135 [MW]   0528 Potassium(!!): 2.7 [MW]   0528 Chloride: 98 [MW]   0528 BUN(!): 4.4 [MW]   0528 Creatinine: 0.71 [MW]   0528 Albumin: 4.1 [MW]   0528 Globulin,  Total(!): 4.3 [MW]   0621 Alkaline Phosphatase: 67 [MW]   0623 On CT evaluation, patient noted to have complex cystic masses in bilateral adnexal regions measuring 3 x 3 cm on the right and 7 x 4.7 cm on the left with mild surrounding pelvic fat stranding and free fluid in the pelvis.  Ovaries were not separately visualize, and reported elements of thickened small bowel in sign UA did into the left adnexa.  Differential diagnosis includes endometriomas versus neoplasm, and possibly to ovarian abscess.  Pelvic ultrasound has been ordered, and will discuss case with gyn for evaluation. [MW]   0639 Due to concerns of potential tubo-ovarian abscess, gyn has been notified the patient is in the emergency room and consulted.  Currently an ultrasound is ordered for further evaluation.  Nurse currently in the process of getting the patient set up for pelvic examination. [MW]   0653 On pelvic examination, significant purulent discharge from cervical os. [MW]   0704 Patient transitioned to Dr. Baker. [MW]      ED Course User Index  [MW] Zach Flores MD                   Clinical Impression:   Final diagnoses:  [N73.0] PID (acute pelvic inflammatory disease) (Primary)  [E87.6] Hypokalemia             Zach Flores MD  09/21/22 0704

## 2022-09-21 NOTE — ED PROVIDER NOTES
Transvaginal ultrasound concerning for complex lesion in the left ovary that could be concerning for hemorrhagic cyst versus infected cyst versus endometrioma versus TOA.      This was discussed with the Gynecology team.  They came and evaluated the patient will admit primarily.     Giles Baker MD  09/21/22 0955

## 2022-09-21 NOTE — H&P
[facility]  H&P      Subjective:      History of Present Illness:  Beatriz Arceo is a 22 y.o.  presenting to ED with 4 days N/V and abdominal pain, constipation. Pt states pain is sharp, located at bilateral lower abdominal quadrants, worse when getting up after being seated for long time. Denies CP, SOB, fevers or chills. Denies vaginal bleeding or abnormal discharge.         Review of Systems:  Pertinent items are noted in HPI. All other systems are reviewed and are negative.     Past Medical History:       Past Medical History:   Diagnosis Date    Cancer      Cervical cancer           Past Surgical History:        Past Surgical History:   Procedure Laterality Date     SECTION N/A 2019     Procedure:  SECTION;  Surgeon: Fred Reynoso MD;  Location: [facility] OR;  Service: OB/GYN;  Laterality: N/A;    froze cervical cancer             Obstetrical History:                   OB History    Para Term  AB Living   2 2 2     2   SAB IAB Ectopic Multiple Live Births              2          # Outcome Date GA Lbr Corey/2nd Weight Sex Delivery Anes PTL Lv   2 Term 19 39w3d   3.45 kg (7 lb 9.7 oz) F CS-LTranv EPI N JESUS      Complications: Chorioamnionitis, Failed induction of labor   1 Term               Y JESUS         Gynecologic History:   LMP 2022  Menarche: r/3-5  STD history: remote hx trichomonas  Pap smear history: hx cryotherapy x2 2 years ago, does not recall pathology     Allergies:  Review of patient's allergies indicates:  No Known Allergies     Medications:   In-Hospital Scheduled Medications:   cefTRIAXone (ROCEPHIN) IVPB  1 g Intravenous ED 1 Time       In-Hospital PRN Medications:      In-Hospital IV Infusion Medications:      Home Medications:          Prior to Admission medications    Medication Sig Start Date End Date Taking? Authorizing Provider   diclofenac (VOLTAREN) 75 MG EC tablet Take 1 tablet (75 mg total) by mouth 2 (two) times daily as  needed (Pain). 10/7/19     Abel Ahn NP   HYDROcodone-acetaminophen (NORCO) 5-325 mg per tablet Take 1 tablet by mouth every 6 (six) hours as needed for Pain. 10/7/19     Abel Ahn NP   hyoscyamine (LEVSIN/SL) 0.125 mg Subl Place 1 tablet (0.125 mg total) under the tongue every 4 (four) hours as needed (Abdominal cramping/bladder spasms). 10/7/19     Abel Ahn NP         Family History:  History reviewed. No pertinent family history.     Social History:  Social History           Tobacco Use    Smoking status: Former    Smokeless tobacco: Never   Substance Use Topics    Alcohol use: No    Drug use: No         Objective:   Last 24 Hour Vital Signs:  BP  Min: 124/71  Max: 131/77  Temp  Av.7 °F (37.6 °C)  Min: 99.7 °F (37.6 °C)  Max: 99.7 °F (37.6 °C)  Pulse  Av.3  Min: 95  Max: 106  Resp  Av.5  Min: 17  Max: 18  SpO2  Av %  Min: 98 %  Max: 100 %  I/O last 3 completed shifts:  In: 1000 [IV Piggyback:1000]  Out: -   There is no height or weight on file to calculate BMI.     Physical Examination:  Vitals          Vitals:     22 0302 22 0530 22 0600 22 0630   BP:   124/71 129/74 131/77   Pulse: 106 99 95 101   Resp: 18 18 17 17   Temp: 99.7 °F (37.6 °C)         TempSrc: Oral         SpO2: 98% 99% 100% 99%            There is no height or weight on file to calculate BMI.     Gen: Alert, cooperative, no distress, appears stated age  CV: RRR  Chest: CTABL, no wheezes/rales/rhonchi  Abdomen: Soft, mild TTP b/l lower quadrants, no masses  Extrem: Extremities normal, atraumatic, no cyanosis or edema.  No calf tenderness or erythema.  External genitalia: Normal female genetalia without lesion, discharge or tenderness.  Speculum Exam: Vaginal vault with scant white discharge, nonodorous, no lesions/masses seen.  Cervical os visualized as parous, closed, no lesions/masses.  Bimanual Exam: No cervical motion tenderness.  Uterus freely mobile.  6-7 week size uterus. No  adnexal masses.  TTP right and left adnexa, fundus     Of note; per ED physician pelvic exam prior to this MD notable for copious foul smelling yellow/green discharge cleared with 4 proctoswabs     Laboratory Results:        Lab Results   Component Value Date     WBC 29.1 (H) 09/21/2022     HGB 10.9 (L) 09/21/2022     HCT 33.6 (L) 09/21/2022     MCV 91.3 09/21/2022      09/21/2022            Radiology:  TVUS 9/21/22:   FINDINGS:  The uterus measures 12.5 x 4.2 x 5.3 cm. The endometrial stripe is 1 cm.     The right ovary is only seen transabdominally. It measures 4.1 x 2 x 2.9 cm. There is a cyst present measuring 1.8 x 1.2 x 2 cm. There is flow present.     There are nabothian cyst present.  There is a small amount of free fluid in the cul de sac.     The left ovary measures 7.3 x 4.6 x 6.2 cm..  This is felt to represent both the ovary and a complex fluid collection adjacent to the ovary.  The 2 are very difficult to separate.  This is most consistent with a hemorrhagic or infected cyst.  An endometrioma cannot be ruled out with certainty.  The flow is hyperemic.     Impression:     Complex lesion adjacent to the left ovary.  Differential would include hemorrhagic and infected cyst.  Endometrioma cannot be ruled out with certainty.  TOA should be considered.        CT Abdomen Pelvis With Contrast     Result Date: 9/21/2022  START OF REPORT: Technique: CT of the abdomen and pelvis was performed with axial images as well as sagittal and coronal reconstruction images with intravenous contrast. Comparison: Comparison is with study dated â2019-10-07 01:49:28â. Clinical History: Abdominal Pain Vomiting Nausea. Dosage Information: Automated Exposure Control was utilized. Findings: Lines and Tubes: None. Thorax: Lungs: The visualized lung bases appear unremarkable. Pleura: No effusions or thickening. Heart: The heart size is within normal limits. Abdomen: Abdominal Wall: No abdominal wall pathology is seen.  Liver: The liver appears unremarkable. Biliary System: No intrahepatic or extrahepatic biliary duct dilatation is seen. Gallbladder: The gallbladder appears unremarkable. Pancreas: The pancreas appears unremarkable. Spleen: The spleen appears unremarkable. Adrenals: The adrenal glands appear unremarkable. Kidneys: The kidneys appear unremarkable with no stones cysts masses or hydronephrosis. Aorta: The abdominal aorta appears unremarkable. IVC: Unremarkable. Bowel: Esophagus: The visualized esophagus appears unremarkable. Stomach: The stomach appears unremarkable. Duodenum: Unremarkable appearing duodenum. Small Bowel: The small bowel appears unremarkable. Colon: The wall of rectum seems thick (series 607 image 67). Consider proctitis. Correlation and follow up advised as clinically indicated. Appendix: The appendix is not identified due to overlapping by the bowel loops but no inflammatory changes are seen in the right lower quadrant to suggest appendicitis. Peritoneum: Mild free fluid is seen in the pelvis. Pelvis: Bladder: The bladder is nondistended however the bladder wall appears thickened after considering state of nondistension which could reflect an element of cystitis. Female: Uterus: The cervix seems irregularly thick. Visual inspection advised. Ovaries: There are complex cystic masses in bilateral adnexa measuring 3.0 x 3.0 cm on right and 7.0 x 4.7 cm on left.Mild surrounding peripelvic fat stranding and free fluid in the pelvis. Bilateral ovaries are not seperately visualised. There may be elements of thickened small bowel insinuated into the left adnexa. Prominent parametrial veins are noted bilaterally. Considerations include endometriomas, and neoplasm, less likely tubo-ovarian abscesses at this age. Suggest pelvic ultrasound evaluation. Bony structures: Dorsal Spine: The visualized dorsal spine appears unremarkable. Bony Pelvis: The visualized bony structures of the pelvis appear unremarkable.  Impression: 1. There are complex cystic masses in bilateral adnexa measuring 3.0 x 3.0 cm on right and 7.0 x 4.7 cm on left.Mild surrounding peripelvic fat stranding and free fluid in the pelvis. Bilateral ovaries are not seperately visualised. There may be elements of thickened small bowel insinuated into the left adnexa. Prominent parametrial veins are noted bilaterally. Considerations include endometriomas, and neoplasm, less likely tubo-ovarian abscesses at this age. Suggest pelvic ultrasound evaluation. 2. The cervix seems irregularly thick. Visual inspection advised. 3. The bladder is nondistended however the bladder wall appears thickened after considering state of nondistension which could reflect an element of cystitis. 4. The wall of rectum seems thick (series 607 image 67). Consider proctitis. Correlation and follow up advised as clinically indicated. 5. Details and other findings as discussed above.         Assessment/Plan:      Beatriz Arceo is a 22 y.o. female with 7cm left adnexal mass, b/l adnexal tenderness and copious malodorous discharge concerning for PID/TOA. Leukocytosis at 27.   -PID/TOA:   - will treat with vanc/zosyn given intraabdominal infection  - Consult IR for possible drainage if no improvement on IV abx  - sepsis protocol ordered; will follow up lactate, blood, urine cultures  - continue IV fluid hydration with LR @ 125ml/hr  - regular diet now, low threshold for NPO  - if no clinical improvement or worsens, IR consult; if cannot be drained plan for dx lap with washout        Discussed with staff, Dr. Provost Magi Mcelroy MD, MPH  [facility], PGY3

## 2022-09-22 LAB
ABS NEUT CALC (OHS): 19.96 X10(3)/MCL (ref 2.1–9.2)
ALBUMIN SERPL-MCNC: 2.5 GM/DL (ref 3.5–5)
ALBUMIN/GLOB SERPL: 0.8 RATIO (ref 1.1–2)
ALP SERPL-CCNC: 54 UNIT/L (ref 40–150)
ALT SERPL-CCNC: 6 UNIT/L (ref 0–55)
ANISOCYTOSIS BLD QL SMEAR: ABNORMAL
AST SERPL-CCNC: 10 UNIT/L (ref 5–34)
BILIRUBIN DIRECT+TOT PNL SERPL-MCNC: 0.7 MG/DL
BUN SERPL-MCNC: 5 MG/DL (ref 7–18.7)
CALCIUM SERPL-MCNC: 8.9 MG/DL (ref 8.4–10.2)
CHLORIDE SERPL-SCNC: 108 MMOL/L (ref 98–107)
CO2 SERPL-SCNC: 20 MMOL/L (ref 22–29)
CREAT SERPL-MCNC: 0.65 MG/DL (ref 0.55–1.02)
ERYTHROCYTE [DISTWIDTH] IN BLOOD BY AUTOMATED COUNT: 14.2 % (ref 11.5–17)
GFR SERPLBLD CREATININE-BSD FMLA CKD-EPI: >60 MLS/MIN/1.73/M2
GLOBULIN SER-MCNC: 3.2 GM/DL (ref 2.4–3.5)
GLUCOSE SERPL-MCNC: 101 MG/DL (ref 74–100)
HCT VFR BLD AUTO: 27.8 % (ref 37–47)
HGB BLD-MCNC: 8.6 GM/DL (ref 12–16)
IMM GRANULOCYTES # BLD AUTO: 0.75 X10(3)/MCL (ref 0–0.04)
IMM GRANULOCYTES NFR BLD AUTO: 3.4 %
LYMPHOCYTES NFR BLD MANUAL: 1.3 X10(3)/MCL
LYMPHOCYTES NFR BLD MANUAL: 6 % (ref 13–40)
MAGNESIUM SERPL-MCNC: 1.5 MG/DL (ref 1.6–2.6)
MCH RBC QN AUTO: 28.8 PG (ref 27–31)
MCHC RBC AUTO-ENTMCNC: 30.9 MG/DL (ref 33–36)
MCV RBC AUTO: 93 FL (ref 80–94)
MONOCYTES NFR BLD MANUAL: 0.43 X10(3)/MCL (ref 0.1–1.3)
MONOCYTES NFR BLD MANUAL: 2 % (ref 2–11)
NEUTROPHILS NFR BLD MANUAL: 92 % (ref 47–80)
NRBC BLD AUTO-RTO: 0 %
PHOSPHATE SERPL-MCNC: 2.6 MG/DL (ref 2.3–4.7)
PLATELET # BLD AUTO: 224 X10(3)/MCL (ref 130–400)
PLATELET # BLD EST: NORMAL 10*3/UL
PMV BLD AUTO: 11.7 FL (ref 7.4–10.4)
POLYCHROMASIA BLD QL SMEAR: ABNORMAL
POTASSIUM SERPL-SCNC: 3.5 MMOL/L (ref 3.5–5.1)
PROT SERPL-MCNC: 5.7 GM/DL (ref 6.4–8.3)
RBC # BLD AUTO: 2.99 X10(6)/MCL (ref 4.2–5.4)
RBC MORPH BLD: ABNORMAL
SODIUM SERPL-SCNC: 139 MMOL/L (ref 136–145)
VANCOMYCIN SERPL-MCNC: 3.8 UG/ML (ref 15–20)
WBC # SPEC AUTO: 21.7 X10(3)/MCL (ref 4.5–11.5)

## 2022-09-22 PROCEDURE — 63600175 PHARM REV CODE 636 W HCPCS: Performed by: STUDENT IN AN ORGANIZED HEALTH CARE EDUCATION/TRAINING PROGRAM

## 2022-09-22 PROCEDURE — A4216 STERILE WATER/SALINE, 10 ML: HCPCS | Performed by: STUDENT IN AN ORGANIZED HEALTH CARE EDUCATION/TRAINING PROGRAM

## 2022-09-22 PROCEDURE — 80202 ASSAY OF VANCOMYCIN: CPT | Performed by: STUDENT IN AN ORGANIZED HEALTH CARE EDUCATION/TRAINING PROGRAM

## 2022-09-22 PROCEDURE — 85027 COMPLETE CBC AUTOMATED: CPT | Performed by: STUDENT IN AN ORGANIZED HEALTH CARE EDUCATION/TRAINING PROGRAM

## 2022-09-22 PROCEDURE — 25000003 PHARM REV CODE 250: Performed by: STUDENT IN AN ORGANIZED HEALTH CARE EDUCATION/TRAINING PROGRAM

## 2022-09-22 PROCEDURE — 36415 COLL VENOUS BLD VENIPUNCTURE: CPT | Performed by: STUDENT IN AN ORGANIZED HEALTH CARE EDUCATION/TRAINING PROGRAM

## 2022-09-22 PROCEDURE — 36410 VNPNXR 3YR/> PHY/QHP DX/THER: CPT

## 2022-09-22 PROCEDURE — 80053 COMPREHEN METABOLIC PANEL: CPT | Performed by: STUDENT IN AN ORGANIZED HEALTH CARE EDUCATION/TRAINING PROGRAM

## 2022-09-22 PROCEDURE — 94761 N-INVAS EAR/PLS OXIMETRY MLT: CPT

## 2022-09-22 PROCEDURE — 84100 ASSAY OF PHOSPHORUS: CPT | Performed by: STUDENT IN AN ORGANIZED HEALTH CARE EDUCATION/TRAINING PROGRAM

## 2022-09-22 PROCEDURE — C1751 CATH, INF, PER/CENT/MIDLINE: HCPCS

## 2022-09-22 PROCEDURE — 21400001 HC TELEMETRY ROOM

## 2022-09-22 PROCEDURE — 83735 ASSAY OF MAGNESIUM: CPT | Performed by: STUDENT IN AN ORGANIZED HEALTH CARE EDUCATION/TRAINING PROGRAM

## 2022-09-22 RX ORDER — SODIUM CHLORIDE 0.9 % (FLUSH) 0.9 %
10 SYRINGE (ML) INJECTION EVERY 6 HOURS
Status: DISCONTINUED | OUTPATIENT
Start: 2022-09-22 | End: 2022-09-25 | Stop reason: HOSPADM

## 2022-09-22 RX ORDER — SODIUM CHLORIDE 0.9 % (FLUSH) 0.9 %
10 SYRINGE (ML) INJECTION
Status: DISCONTINUED | OUTPATIENT
Start: 2022-09-22 | End: 2022-09-25 | Stop reason: HOSPADM

## 2022-09-22 RX ORDER — MAGNESIUM SULFATE HEPTAHYDRATE 40 MG/ML
2 INJECTION, SOLUTION INTRAVENOUS ONCE
Status: COMPLETED | OUTPATIENT
Start: 2022-09-22 | End: 2022-09-22

## 2022-09-22 RX ADMIN — SODIUM CHLORIDE, POTASSIUM CHLORIDE, SODIUM LACTATE AND CALCIUM CHLORIDE: 600; 310; 30; 20 INJECTION, SOLUTION INTRAVENOUS at 01:09

## 2022-09-22 RX ADMIN — METRONIDAZOLE 500 MG: 500 TABLET ORAL at 08:09

## 2022-09-22 RX ADMIN — SODIUM CHLORIDE, PRESERVATIVE FREE 10 ML: 5 INJECTION INTRAVENOUS at 06:09

## 2022-09-22 RX ADMIN — PIPERACILLIN AND TAZOBACTAM 4.5 G: 4; .5 INJECTION, POWDER, LYOPHILIZED, FOR SOLUTION INTRAVENOUS; PARENTERAL at 01:09

## 2022-09-22 RX ADMIN — VANCOMYCIN HYDROCHLORIDE 750 MG: 750 INJECTION, POWDER, LYOPHILIZED, FOR SOLUTION INTRAVENOUS at 08:09

## 2022-09-22 RX ADMIN — PIPERACILLIN AND TAZOBACTAM 4.5 G: 4; .5 INJECTION, POWDER, LYOPHILIZED, FOR SOLUTION INTRAVENOUS; PARENTERAL at 03:09

## 2022-09-22 RX ADMIN — MAGNESIUM SULFATE 2 G: 2 INJECTION INTRAVENOUS at 10:09

## 2022-09-22 RX ADMIN — IBUPROFEN 600 MG: 600 TABLET ORAL at 11:09

## 2022-09-22 RX ADMIN — POTASSIUM CHLORIDE 40 MEQ: 1500 TABLET, EXTENDED RELEASE ORAL at 08:09

## 2022-09-22 RX ADMIN — IBUPROFEN 600 MG: 600 TABLET ORAL at 03:09

## 2022-09-22 RX ADMIN — VANCOMYCIN HYDROCHLORIDE 750 MG: 750 INJECTION, POWDER, LYOPHILIZED, FOR SOLUTION INTRAVENOUS at 01:09

## 2022-09-22 RX ADMIN — SODIUM CHLORIDE, PRESERVATIVE FREE 10 ML: 5 INJECTION INTRAVENOUS at 12:09

## 2022-09-22 RX ADMIN — POLYETHYLENE GLYCOL 3350 17 G: 17 POWDER, FOR SOLUTION ORAL at 08:09

## 2022-09-22 RX ADMIN — KETOROLAC TROMETHAMINE 30 MG: 30 INJECTION, SOLUTION INTRAMUSCULAR; INTRAVENOUS at 05:09

## 2022-09-22 RX ADMIN — ACETAMINOPHEN 650 MG: 325 TABLET ORAL at 04:09

## 2022-09-22 NOTE — PLAN OF CARE
09/22/22 1009   Discharge Assessment   Assessment Type Discharge Planning Assessment   Confirmed/corrected address, phone number and insurance Yes   Confirmed Demographics Correct on Facesheet   Source of Information patient   Reason For Admission Pelvic Inflammatory Disease   Lives With sibling(s);parent(s)   Do you expect to return to your current living situation? Yes   Do you have help at home or someone to help you manage your care at home? Yes   Who are your caregiver(s) and their phone number(s)? kobi Michael, [phone]   Prior to hospitilization cognitive status: No Deficits;Alert/Oriented   Current cognitive status: No Deficits;Alert/Oriented   Walking or Climbing Stairs Difficulty none   Dressing/Bathing Difficulty none   Home Layout Able to live on 1st floor   Equipment Currently Used at Home none   Readmission within 30 days? No   Patient currently being followed by outpatient case management? No   Do you currently have service(s) that help you manage your care at home? No   Do you take prescription medications? No   Who is going to help you get home at discharge? TBD   Are you on dialysis? No   Do you take coumadin? No   Discharge Plan A Home   DME Needed Upon Discharge  none   Discharge Plan discussed with: Patient   Discharge Barriers Identified None

## 2022-09-22 NOTE — PROGRESS NOTES
Pharmacokinetic Assessment Follow Up: IV Vancomycin    Vancomycin serum concentration assessment(s):    The random level was drawn correctly and can be used to guide therapy at this time. The measurement is below the desired definitive target range of 10 to 20 mcg/mL.    Vancomycin Regimen Plan:    Change regimen to Vancomycin 750 mg IV every 8 hours with next serum trough concentration measured at 60 mins prior to 0400 dose on 09/23/22    Drug levels (last 3 results):  Recent Labs   Lab Result Units 09/22/22  1104   Vanc Lvl Random ug/ml 3.8*       Pharmacy will continue to follow and monitor vancomycin.    Please contact pharmacy at extension [phone] for questions regarding this assessment.    Thank you for the consult,   Sasha Do       Patient brief summary:  Beatriz Arceo is a 22 y.o. female initiated on antimicrobial therapy with IV Vancomycin for treatment of intra-abdominal infection    The patient's current regimen is 750mg q8h    Drug Allergies:   Review of patient's allergies indicates:  No Known Allergies    Actual Body Weight:   52.2kg    Renal Function:   Estimated Creatinine Clearance: 95.9 mL/min (based on SCr of 0.65 mg/dL).,         CBC (last 72 hours):  Recent Labs   Lab Result Units 09/21/22  0409 09/22/22  0427   WBC x10(3)/mcL 29.1* 21.7*   Hgb gm/dL 10.9* 8.6*   Hct % 33.6* 27.8*   Platelet x10(3)/mcL 276 224   Mono % % 7.3  --    Monocyte Man %  --  2   Eos % % 0.0  --    Basophil % % 0.1  --        Metabolic Panel (last 72 hours):  Recent Labs   Lab Result Units 09/21/22  0329 09/21/22  0409 09/21/22  0529 09/22/22  0427   Sodium Level mmol/L  --  135*  --  139   Potassium Level mmol/L  --  2.7*  --  3.5   Chloride mmol/L  --  98  --  108*   Carbon Dioxide mmol/L  --  19*  --  20*   Glucose Level mg/dL  --  108*  --  101*   Glucose, UA mg/dL Normal  --   --   --    Blood Urea Nitrogen mg/dL  --  4.4*  --  5.0*   Creatinine mg/dL  --  0.71  --  0.65   Albumin Level gm/dL  --  4.1  --   2.5*   Bilirubin Total mg/dL  --  1.3  --  0.7   Alkaline Phosphatase unit/L  --  67  --  54   Aspartate Aminotransferase unit/L  --  11  --  10   Alanine Aminotransferase unit/L  --  5  --  6   Magnesium Level mg/dL  --   --  1.80 1.50*   Phosphorus Level mg/dL  --   --  3.3 2.6       Vancomycin Administrations:  vancomycin given in the last 96 hours                     vancomycin 750 mg in sodium chloride 0.9% 250 mL IVPB (mg) 750 mg New Bag 09/21/22 2333    vancomycin (VANCOCIN) 1,000 mg in sodium chloride 0.9% 250 mL IVPB (mg) 1,000 mg New Bag 09/21/22 1119                    Microbiologic Results:  Microbiology Results (last 7 days)       Procedure Component Value Units Date/Time    Blood Culture [[id number]] Collected: 09/21/22 0957    Order Status: Resulted Specimen: Blood Updated: 09/21/22 1013    Blood Culture [[id number]]     Order Status: Canceled Specimen: Blood     Chlamydia/GC, PCR [[id number]]  (Normal) Collected: 09/21/22 0329    Order Status: Completed Specimen: Urine Updated: 09/21/22 0918     Chlamydia trachomatis PCR Not Detected     N. gonorrhea PCR Not Detected    Blood Culture [[id number]] Collected: 09/21/22 0817    Order Status: Resulted Specimen: Blood Updated: 09/21/22 0822    Blood Culture [[id number]] Collected: 09/21/22 0817    Order Status: Resulted Specimen: Blood Updated: 09/21/22 0822    Wet Prep, Genital [[id number]]  (Abnormal) Collected: 09/21/22 0741    Order Status: Completed Specimen: Genital from Cervicovaginal Updated: 09/21/22 0751     WBC, Wet Prep Many     Clue Cells, Wet Prep None Seen     Trichomonas, Wet Prep Few     Yeast, Wet Prep None Seen    Trichomonas Prep Wet Mount [[id number]]     Order Status: Canceled Specimen: Vagina

## 2022-09-22 NOTE — PROCEDURES
Beatriz Arceo is a 22 y.o. female patient.    Temp: 99 °F (37.2 °C) (09/22/22 0750)  Pulse: 99 (09/22/22 0750)  Resp: 20 (09/22/22 0750)  BP: 130/81 (09/22/22 0750)  SpO2: 100 % (09/22/22 0750)  Weight: 52.2 kg (115 lb) (09/21/22 1400)  Height: 5' (152.4 cm) (09/21/22 1400)    PICC  Date/Time: 9/22/2022 9:47 AM  Performed by: Berenice Colon RN  Consent Done: Yes  Time out: Immediately prior to procedure a time out was called to verify the correct patient, procedure, equipment, support staff and site/side marked as required  Indications: med administration  Anesthesia: local infiltration  Local anesthetic: lidocaine 1% without epinephrine  Anesthetic Total (mL): 2  Preparation: skin prepped with ChloraPrep  Skin prep agent dried: skin prep agent completely dried prior to procedure  Sterile barriers: all five maximum sterile barriers used - cap, mask, sterile gown, sterile gloves, and large sterile sheet  Hand hygiene: hand hygiene performed prior to central venous catheter insertion  Location details: left basilic  Catheter type: single lumen  Catheter size: 4 Fr  Catheter Length: 15cm    Ultrasound guidance: yes  Vessel Caliber: medium and patent, compressibility normal  Needle advanced into vessel with real time Ultrasound guidance.  Guidewire confirmed in vessel.  Sterile sheath used.  Number of attempts: 1  Post-procedure: blood return through all ports, sterile dressing applied and chlorhexidine patch    Assessment: successful placement  Complications: none  Comments: Pt is on vesicant abx therapy, anticipated discharge in 3 days, midline appropriate line at this time.        Berenice colon rn  9/22/2022

## 2022-09-22 NOTE — PROGRESS NOTES
[facility] INPATIENT NOTE    Subjective:       Beatriz Arceo is a 22 y.o.  admitted for IV abx in the setting of suspected PID/TOA, found to have trichomonas. HD2.    Patient resting in bed overnight, main concern this AM is pain at IV site in R arm due to infiltration. Reports pain in abdomen as mild, improving. Denies fevers/chills, CP, SOB, N/V. No other complaints.     Objective:     VITAL SIGNS: 24 HR MIN & MAX Most Recent Vitals 24 HR Intake & Output   Temp  Min: 99 °F (37.2 °C)  Max: 102.2 °F (39 °C)  99 °F (37.2 °C)  0701 -  0700  In: 850 [I.V.:750]  Out: -    BP  Min: 103/63  Max: 137/79  105/63     Pulse  Min: 91  Max: 113  100     Resp  Min: 16  Max: 20  20     SpO2  Min: 99 %  Max: 100 %  100 %       BP  Min: 103/63  Max: 137/79  Temp  Av.2 °F (37.9 °C)  Min: 99 °F (37.2 °C)  Max: 102.2 °F (39 °C)  Pulse  Av.5  Min: 91  Max: 113  Resp  Av.2  Min: 16  Max: 20  SpO2  Av.7 %  Min: 99 %  Max: 100 %  Height  Av' (152.4 cm)  Min: 5' (152.4 cm)  Max: 5' (152.4 cm)  Weight  Av.3 kg (117 lb 8 oz)  Min: 52.2 kg (115 lb)  Max: 54.4 kg (120 lb)    Body mass index is 22.46 kg/m².    I/O last 3 completed shifts:  In: 1850 [I.V.:750; IV Piggyback:1100]  Out: -       Intake/Output Summary (Last 24 hours) at 2022 0607  Last data filed at 2022 1818  Gross per 24 hour   Intake 850 ml   Output --   Net 850 ml       Physical Exam:   Gen: Alert, cooperative, no distress, appears stated age  CV: RRR  Chest: CTABL, no wheezes/rales/rhonchi  Abdomen: Soft, mild TTP b/l lower quadrants, no masses. No guarding or rebound.   Extrem: Extremities normal, atraumatic, no cyanosis or edema.  No calf tenderness or erythema.  External genitalia: Normal female genetalia without lesion. Small amount of yellow/greenish discharge staining surrounding bedsheets.      Labs:   Recent Labs   Lab 22  0409 22  0427   WBC 29.1* 21.7*   HGB 10.9* 8.6*   HCT 33.6* 27.8*   PLT  276 224   MCV 91.3 93.0   RDW 13.9 14.2   * 139   K 2.7* 3.5   CO2 19* 20*   BUN 4.4* 5.0*   CREATININE 0.71 0.65   CALCIUM 10.2 8.9   ALBUMIN 4.1 2.5*   BILITOT 1.3 0.7   AST 11 10   ALT 5 6   ALKPHOS 67 54     Recent Results (from the past 24 hour(s))   Wet Prep, Genital    Collection Time: 09/21/22  7:41 AM    Specimen: Cervicovaginal; Genital   Result Value Ref Range    WBC, Wet Prep Many (A) None Seen    Clue Cells, Wet Prep None Seen None Seen    Trichomonas, Wet Prep Few (A) None Seen    Yeast, Wet Prep None Seen None Seen   COVID-19 Rapid Screening    Collection Time: 09/21/22  9:52 AM   Result Value Ref Range    SARS COV-2 MOLECULAR Negative Negative   Lactic acid, plasma #1    Collection Time: 09/21/22 10:33 AM   Result Value Ref Range    Lactic Acid Level 1.1 0.5 - 2.2 mmol/L   Lactic acid, plasma #2    Collection Time: 09/21/22  1:32 PM   Result Value Ref Range    Lactic Acid Level 0.7 0.5 - 2.2 mmol/L   Comprehensive metabolic panel (daily)    Collection Time: 09/22/22  4:27 AM   Result Value Ref Range    Sodium Level 139 136 - 145 mmol/L    Potassium Level 3.5 3.5 - 5.1 mmol/L    Chloride 108 (H) 98 - 107 mmol/L    Carbon Dioxide 20 (L) 22 - 29 mmol/L    Glucose Level 101 (H) 74 - 100 mg/dL    Blood Urea Nitrogen 5.0 (L) 7.0 - 18.7 mg/dL    Creatinine 0.65 0.55 - 1.02 mg/dL    Calcium Level Total 8.9 8.4 - 10.2 mg/dL    Protein Total 5.7 (L) 6.4 - 8.3 gm/dL    Albumin Level 2.5 (L) 3.5 - 5.0 gm/dL    Globulin 3.2 2.4 - 3.5 gm/dL    Albumin/Globulin Ratio 0.8 (L) 1.1 - 2.0 ratio    Bilirubin Total 0.7 <=1.5 mg/dL    Alkaline Phosphatase 54 40 - 150 unit/L    Alanine Aminotransferase 6 0 - 55 unit/L    Aspartate Aminotransferase 10 5 - 34 unit/L    eGFR >60 mls/min/1.73/m2   Magnesium - Daily    Collection Time: 09/22/22  4:27 AM   Result Value Ref Range    Magnesium Level 1.50 (L) 1.60 - 2.60 mg/dL   Phosphorus -Daily    Collection Time: 09/22/22  4:27 AM   Result Value Ref Range    Phosphorus  Level 2.6 2.3 - 4.7 mg/dL   CBC with Differential    Collection Time: 09/22/22  4:27 AM   Result Value Ref Range    WBC 21.7 (H) 4.5 - 11.5 x10(3)/mcL    RBC 2.99 (L) 4.20 - 5.40 x10(6)/mcL    Hgb 8.6 (L) 12.0 - 16.0 gm/dL    Hct 27.8 (L) 37.0 - 47.0 %    MCV 93.0 80.0 - 94.0 fL    MCH 28.8 27.0 - 31.0 pg    MCHC 30.9 (L) 33.0 - 36.0 mg/dL    RDW 14.2 11.5 - 17.0 %    Platelet 224 130 - 400 x10(3)/mcL    MPV 11.7 (H) 7.4 - 10.4 fL    IG# 0.75 (H) 0 - 0.04 x10(3)/mcL    IG% 3.4 %    NRBC% 0.0 %   Manual Differential    Collection Time: 09/22/22  4:27 AM   Result Value Ref Range    Neut Man 92 (H) 47 - 80 %    Lymph Man 6 (L) 13 - 40 %    Monocyte Man 2 2 - 11 %    Abs Neut calc 19.964 (H) 2.1 - 9.2 x10(3)/mcL    Abs Mono 0.434 0.1 - 1.3 x10(3)/mcL    Abs Lymp 1.302 0.6 - 4.6 x10(3)/mcL    RBC Morph Abnormal (A) Normal    Anisocyte 1+ (A) (none)    Polychrom 1+ (A) (none)    Platelet Est Normal Normal, Adequate       Microbiology Data:  No results for input(s): CULTURE, SARSCOV2, INFLUENZAA, INFLUENZAB in the last 168 hours.    Invalid input(s): CURINE, CBLOOD, CULTURERESUL, CRESPWSM, SPECIMENDESCRIPTION, CCSF, GRAMSMEAR, CHLAMDNA, NGONDNA, RSVPCR     Radiology:  CT Abdomen Pelvis With Contrast    Result Date: 9/21/2022  EXAMINATION CT ABDOMEN PELVIS WITH CONTRAST CLINICAL HISTORY Abdominal pain, acute, nonlocalized; TECHNIQUE Post-contrast helical-acquisition CT images were obtained and multiplanar reformats accomplished by a CT technologist at a separate workstation, pushed to PACS for physician review. CONTRAST *IV: ISOVUE-370, 100 mL *Enteric: none COMPARISON 7 October 2019 FINDINGS Images were reviewed in soft tissue, lung, and bone windows. Exam quality: adequate for evaluation Lines/tubes: none visualized Cardiopulmonary: Visualized heart chambers are normal volume.  No acute or focal abnormality of the included lower lung zones.  No significant pericardial or pleural fluid. Hepatobiliary/Pancreas: No acute or  focal liver abnormality.  Gallbladder is unremarkable.  No convincing obstructive biliary process.  There is no evidence of expansile pancreatic lesion, ductal dilatation, or peripancreatic inflammatory changes. Spleen: No acute or focal abnormality. Adrenals/: No suspicious adrenal or renal lesion. No radiopaque urolithiasis or evidence of distal obstructive uropathy.  There is diffusely thickened appearance of the urinary bladder wall, with no focal mural irregularity or convincing intraluminal abnormality.  Diffusely thickened and heterogeneous appearance of the cervix is noted, with small amount of air through the vaginal and cervical canal. There are complex cystic bilateral adnexal masses measuring 3 cm x 3 cm on right and 7 cm x 4.7 cm on left.  Neither ovary ovaries is convincingly visualized separate from the structures.  Mild surrounding peripelvic fat stranding and free fluid are also noted. Esophagus/GI tract: The included lower esophagus is unremarkable.  No evidence of gastric outlet or small bowel obstruction. There is appearance of scattered small bowel wall thickening at the left lower abdomen, which may be related to contiguous inflammatory changes due to adjacent adnexal findings mentioned above.  The appendix is unremarkable.  No focal abnormality of the colon is appreciated, although there is somewhat thickened appearance of the rectal wall. Peritoneal/Extraperitoneal Spaces: No free intra-abdominal air or drainable collections are identified.  No focal abnormality of the regional vascular structures is identified.  No pathologic carito enlargement or necrotic process. Musculoskeletal: No acute process or suspicious focal abnormality. IMPRESSION 1. Irregular cystic bilateral adnexal masses with surrounding inflammatory changes, overall appearance nonspecific by CT assessment.  Differential includes endometriomas or infectious etiology; neoplastic disease is less favored given patient age, but  not entirely excluded. 2. Diffusely thickened appearance of the bladder wall may reflect changes of cystitis. 3. Prominent appearance of left lower quadrant small bowel wall and distal rectal wall may be secondary to adjacent adnexal inflammatory changes.  Possibility of acute enteritis/proctitis not excluded. ========== No significant discrepancy identified in relation to the teleradiology preliminary report. RADIATION DOSE Automated tube current modulation, weight-based exposure dosing, and/or iterative reconstruction technique utilized to reach lowest reasonably achievable exposure rate. DLP: 341 mGy*cm Electronically signed by: Rj Carson Date:    09/21/2022 Time:    07:37    US Pelvis Comp with Transvag NON-OB (xpd    Result Date: 9/21/2022  EXAMINATION: US PELVIS COMP WITH TRANSVAG NON-OB (XPD) CLINICAL HISTORY: Pelvic pain; TECHNIQUE: Exam is performed both transabdominally and endovaginally COMPARISON: None FINDINGS: The uterus measures 12.5 x 4.2 x 5.3 cm. The endometrial stripe is 1 cm. The right ovary is only seen transabdominally. It measures 4.1 x 2 x 2.9 cm. There is a cyst present measuring 1.8 x 1.2 x 2 cm. There is flow present. There are nabothian cyst present.  There is a small amount of free fluid in the cul de sac. The left ovary measures 7.3 x 4.6 x 6.2 cm..  This is felt to represent both the ovary and a complex fluid collection adjacent to the ovary.  The 2 are very difficult to separate.  This is most consistent with a hemorrhagic or infected cyst.  An endometrioma cannot be ruled out with certainty.  The flow is hyperemic.     Complex lesion adjacent to the left ovary.  Differential would include hemorrhagic and infected cyst.  Endometrioma cannot be ruled out with certainty.  TOA should be considered. Electronically signed by: Primo Farrar MD Date:    09/21/2022 Time:    08:43      Medications  Home medications  Medications Prior to Admission   Medication Sig Dispense Refill Last Dose     diclofenac (VOLTAREN) 75 MG EC tablet Take 1 tablet (75 mg total) by mouth 2 (two) times daily as needed (Pain). 30 tablet 0     HYDROcodone-acetaminophen (NORCO) 5-325 mg per tablet Take 1 tablet by mouth every 6 (six) hours as needed for Pain. 12 tablet 0     hyoscyamine (LEVSIN/SL) 0.125 mg Subl Place 1 tablet (0.125 mg total) under the tongue every 4 (four) hours as needed (Abdominal cramping/bladder spasms). 30 tablet 0      Current Inpatient medications  Scheduled   ibuprofen  600 mg Oral Q6H    metroNIDAZOLE  500 mg Oral Q12H    piperacillin-tazobactam (ZOSYN) IVPB  4.5 g Intravenous Q8H    polyethylene glycol  17 g Oral Daily    potassium chloride  40 mEq Oral BID    vancomycin (VANCOCIN) IVPB  750 mg Intravenous Q12H     Prn medications: acetaminophen, dextrose 10%, dextrose 10%, glucagon (human recombinant), glucose, glucose, magnesium oxide, magnesium oxide, ondansetron, oxyCODONE, oxyCODONE, promethazine, sodium chloride 0.9%, sodium chloride 0.9%, Pharmacy to dose Vancomycin consult **AND** vancomycin - pharmacy to dose    Antibiotics and Day Number of Therapy:  S/p 1 g Rocephin   D2 Vanc   D2 Flagyl  D2 Zosyn     Lines   PIV    Blood Products:  None     Consults:  Interventional Radiology    Assessment/Plan:       22 y.o.  admitted for IV abx in the setting of suspected PID/TOA, found to have trichomonas. HD2.    -PID/TOA:   - Afebrile overnight, abdomen minimally TTP this AM  - Leukocytosis downtrending 29.1 >21.7   - Lactate WNL 0.7  - S/p 1 g Rocephin, D2 Vanc, Zosyn. Flagyl added on last night given presence of trichomonas   - Continue abx until 48 hrs afebrile  - sepsis protocol ordered. Follow up blood cultures   - continue IV fluid hydration with LR @ 125ml/hr    -Anemia:  - 10.9/33.6 >8.6/27.8  - Suspect dilutional 2/2 IVF  - Pt is w/o vaginal bleeding, abdomen soft, minimally ttp    -Hypomagnesemia  - 1.50 this AM, will replete with 2g IV  - Repeat CMP tomorrow AM     Disposition:    Anticipated disposition plan: Home   She is not meeting milestones for discharge.   To discharge once TOA adequately treated   Other anticipated discharge needs: None     Will discuss with staff    Nichol Vale MD   [facility] PGY-2

## 2022-09-22 NOTE — CONSULTS
"Discussed consult with Dr. Mcelroy. Notified her that transportation benefits through insurance only apply to pt, not family. Pt's family would have to call a cab to come visit pt at hospital if that is what they wish to do. Clarifying "counseling" at this time, if it is mental health, will provide resource through social work folder.  "

## 2022-09-22 NOTE — PROGRESS NOTES
[facility] INPATIENT NOTE    Subjective:       Beatriz Arceo is a 22 y.o.  admitted for IV abx in the setting of suspected PID/TOA, found to have trichomonas. OSORIO2. MD to bedside for PM rounds     Patient sitting up in bed, about to ambulate to restroom. Reports feeling warm, most recent temp noted to be 100.5F at 1659. States that abdominal pain is improving. Has minimal appetite, states she has picked at her trays. Denies CP, SOB, N/V. No other complaints.     Objective:     VITAL SIGNS: 24 HR MIN & MAX Most Recent Vitals 24 HR Intake & Output   Temp  Min: 97.8 °F (36.6 °C)  Max: 100.5 °F (38.1 °C)  (!) 100.5 °F (38.1 °C)  0701 -  0700  In: 850 [I.V.:750]  Out: -    BP  Min: 103/63  Max: 130/81  119/68     Pulse  Min: 99  Max: 113  107     Resp  Min: 20  Max: 20  20     SpO2  Min: 99 %  Max: 100 %  100 %       BP  Min: 103/63  Max: 130/81  Temp  Av.5 °F (37.5 °C)  Min: 97.8 °F (36.6 °C)  Max: 100.5 °F (38.1 °C)  Pulse  Av.3  Min: 99  Max: 113  Resp  Av  Min: 20  Max: 20  SpO2  Av.9 %  Min: 99 %  Max: 100 %    Body mass index is 22.46 kg/m².    I/O last 3 completed shifts:  In: 1850 [I.V.:750; IV Piggyback:1100]  Out: -       Intake/Output Summary (Last 24 hours) at 2022 1705  Last data filed at 2022 1428  Gross per 24 hour   Intake 1050 ml   Output --   Net 1050 ml         Physical Exam:   Gen: Alert, cooperative, no distress, appears stated age  CV: RRR  Chest: CTABL, no wheezes/rales/rhonchi  Abdomen: Soft, mild TTP b/l lower quadrants however improved from this AM, no masses. No guarding or rebound.   Extrem: Extremities normal, atraumatic, no cyanosis or edema.  No calf tenderness or erythema.  External genitalia: Normal female genetalia without lesion. Small amount of yellow/greenish discharge staining surrounding bedsheets.      Labs:   Recent Labs   Lab 22  0409 22  0427   WBC 29.1* 21.7*   HGB 10.9* 8.6*   HCT 33.6* 27.8*    224   MCV  91.3 93.0   RDW 13.9 14.2   * 139   K 2.7* 3.5   CO2 19* 20*   BUN 4.4* 5.0*   CREATININE 0.71 0.65   CALCIUM 10.2 8.9   ALBUMIN 4.1 2.5*   BILITOT 1.3 0.7   AST 11 10   ALT 5 6   ALKPHOS 67 54       Recent Results (from the past 24 hour(s))   Comprehensive metabolic panel (daily)    Collection Time: 09/22/22  4:27 AM   Result Value Ref Range    Sodium Level 139 136 - 145 mmol/L    Potassium Level 3.5 3.5 - 5.1 mmol/L    Chloride 108 (H) 98 - 107 mmol/L    Carbon Dioxide 20 (L) 22 - 29 mmol/L    Glucose Level 101 (H) 74 - 100 mg/dL    Blood Urea Nitrogen 5.0 (L) 7.0 - 18.7 mg/dL    Creatinine 0.65 0.55 - 1.02 mg/dL    Calcium Level Total 8.9 8.4 - 10.2 mg/dL    Protein Total 5.7 (L) 6.4 - 8.3 gm/dL    Albumin Level 2.5 (L) 3.5 - 5.0 gm/dL    Globulin 3.2 2.4 - 3.5 gm/dL    Albumin/Globulin Ratio 0.8 (L) 1.1 - 2.0 ratio    Bilirubin Total 0.7 <=1.5 mg/dL    Alkaline Phosphatase 54 40 - 150 unit/L    Alanine Aminotransferase 6 0 - 55 unit/L    Aspartate Aminotransferase 10 5 - 34 unit/L    eGFR >60 mls/min/1.73/m2   Magnesium - Daily    Collection Time: 09/22/22  4:27 AM   Result Value Ref Range    Magnesium Level 1.50 (L) 1.60 - 2.60 mg/dL   Phosphorus -Daily    Collection Time: 09/22/22  4:27 AM   Result Value Ref Range    Phosphorus Level 2.6 2.3 - 4.7 mg/dL   CBC with Differential    Collection Time: 09/22/22  4:27 AM   Result Value Ref Range    WBC 21.7 (H) 4.5 - 11.5 x10(3)/mcL    RBC 2.99 (L) 4.20 - 5.40 x10(6)/mcL    Hgb 8.6 (L) 12.0 - 16.0 gm/dL    Hct 27.8 (L) 37.0 - 47.0 %    MCV 93.0 80.0 - 94.0 fL    MCH 28.8 27.0 - 31.0 pg    MCHC 30.9 (L) 33.0 - 36.0 mg/dL    RDW 14.2 11.5 - 17.0 %    Platelet 224 130 - 400 x10(3)/mcL    MPV 11.7 (H) 7.4 - 10.4 fL    IG# 0.75 (H) 0 - 0.04 x10(3)/mcL    IG% 3.4 %    NRBC% 0.0 %   Manual Differential    Collection Time: 09/22/22  4:27 AM   Result Value Ref Range    Neut Man 92 (H) 47 - 80 %    Lymph Man 6 (L) 13 - 40 %    Monocyte Man 2 2 - 11 %    Abs Neut calc  19.964 (H) 2.1 - 9.2 x10(3)/mcL    Abs Mono 0.434 0.1 - 1.3 x10(3)/mcL    Abs Lymp 1.302 0.6 - 4.6 x10(3)/mcL    RBC Morph Abnormal (A) Normal    Anisocyte 1+ (A) (none)    Polychrom 1+ (A) (none)    Platelet Est Normal Normal, Adequate   Vancomycin, Random    Collection Time: 09/22/22 11:04 AM   Result Value Ref Range    Vanc Lvl Random 3.8 (L) 15.0 - 20.0 ug/ml       Microbiology Data:  No results for input(s): CULTURE, SARSCOV2, INFLUENZAA, INFLUENZAB in the last 168 hours.    Invalid input(s): CURINE, CBLOOD, CULTURERESUL, CRESPWSM, SPECIMENDESCRIPTION, CCSF, GRAMSMEAR, CHLAMDNA, NGONDNA, RSVPCR     Radiology:  CT Abdomen Pelvis With Contrast    Result Date: 9/21/2022  EXAMINATION CT ABDOMEN PELVIS WITH CONTRAST CLINICAL HISTORY Abdominal pain, acute, nonlocalized; TECHNIQUE Post-contrast helical-acquisition CT images were obtained and multiplanar reformats accomplished by a CT technologist at a separate workstation, pushed to PACS for physician review. CONTRAST *IV: ISOVUE-370, 100 mL *Enteric: none COMPARISON 7 October 2019 FINDINGS Images were reviewed in soft tissue, lung, and bone windows. Exam quality: adequate for evaluation Lines/tubes: none visualized Cardiopulmonary: Visualized heart chambers are normal volume.  No acute or focal abnormality of the included lower lung zones.  No significant pericardial or pleural fluid. Hepatobiliary/Pancreas: No acute or focal liver abnormality.  Gallbladder is unremarkable.  No convincing obstructive biliary process.  There is no evidence of expansile pancreatic lesion, ductal dilatation, or peripancreatic inflammatory changes. Spleen: No acute or focal abnormality. Adrenals/: No suspicious adrenal or renal lesion. No radiopaque urolithiasis or evidence of distal obstructive uropathy.  There is diffusely thickened appearance of the urinary bladder wall, with no focal mural irregularity or convincing intraluminal abnormality.  Diffusely thickened and heterogeneous  appearance of the cervix is noted, with small amount of air through the vaginal and cervical canal. There are complex cystic bilateral adnexal masses measuring 3 cm x 3 cm on right and 7 cm x 4.7 cm on left.  Neither ovary ovaries is convincingly visualized separate from the structures.  Mild surrounding peripelvic fat stranding and free fluid are also noted. Esophagus/GI tract: The included lower esophagus is unremarkable.  No evidence of gastric outlet or small bowel obstruction. There is appearance of scattered small bowel wall thickening at the left lower abdomen, which may be related to contiguous inflammatory changes due to adjacent adnexal findings mentioned above.  The appendix is unremarkable.  No focal abnormality of the colon is appreciated, although there is somewhat thickened appearance of the rectal wall. Peritoneal/Extraperitoneal Spaces: No free intra-abdominal air or drainable collections are identified.  No focal abnormality of the regional vascular structures is identified.  No pathologic carito enlargement or necrotic process. Musculoskeletal: No acute process or suspicious focal abnormality. IMPRESSION 1. Irregular cystic bilateral adnexal masses with surrounding inflammatory changes, overall appearance nonspecific by CT assessment.  Differential includes endometriomas or infectious etiology; neoplastic disease is less favored given patient age, but not entirely excluded. 2. Diffusely thickened appearance of the bladder wall may reflect changes of cystitis. 3. Prominent appearance of left lower quadrant small bowel wall and distal rectal wall may be secondary to adjacent adnexal inflammatory changes.  Possibility of acute enteritis/proctitis not excluded. ========== No significant discrepancy identified in relation to the teleradiology preliminary report. RADIATION DOSE Automated tube current modulation, weight-based exposure dosing, and/or iterative reconstruction technique utilized to reach  lowest reasonably achievable exposure rate. DLP: 341 mGy*cm Electronically signed by: Rj Carson Date:    09/21/2022 Time:    07:37    US Pelvis Comp with Transvag NON-OB (xpd    Result Date: 9/21/2022  EXAMINATION: US PELVIS COMP WITH TRANSVAG NON-OB (XPD) CLINICAL HISTORY: Pelvic pain; TECHNIQUE: Exam is performed both transabdominally and endovaginally COMPARISON: None FINDINGS: The uterus measures 12.5 x 4.2 x 5.3 cm. The endometrial stripe is 1 cm. The right ovary is only seen transabdominally. It measures 4.1 x 2 x 2.9 cm. There is a cyst present measuring 1.8 x 1.2 x 2 cm. There is flow present. There are nabothian cyst present.  There is a small amount of free fluid in the cul de sac. The left ovary measures 7.3 x 4.6 x 6.2 cm..  This is felt to represent both the ovary and a complex fluid collection adjacent to the ovary.  The 2 are very difficult to separate.  This is most consistent with a hemorrhagic or infected cyst.  An endometrioma cannot be ruled out with certainty.  The flow is hyperemic.     Complex lesion adjacent to the left ovary.  Differential would include hemorrhagic and infected cyst.  Endometrioma cannot be ruled out with certainty.  TOA should be considered. Electronically signed by: Primo Farrar MD Date:    09/21/2022 Time:    08:43      Medications  Home medications  Medications Prior to Admission   Medication Sig Dispense Refill Last Dose    diclofenac (VOLTAREN) 75 MG EC tablet Take 1 tablet (75 mg total) by mouth 2 (two) times daily as needed (Pain). 30 tablet 0     HYDROcodone-acetaminophen (NORCO) 5-325 mg per tablet Take 1 tablet by mouth every 6 (six) hours as needed for Pain. 12 tablet 0     hyoscyamine (LEVSIN/SL) 0.125 mg Subl Place 1 tablet (0.125 mg total) under the tongue every 4 (four) hours as needed (Abdominal cramping/bladder spasms). 30 tablet 0      Current Inpatient medications  Scheduled   ibuprofen  600 mg Oral Q6H    metroNIDAZOLE  500 mg Oral Q12H     piperacillin-tazobactam (ZOSYN) IVPB  4.5 g Intravenous Q8H    polyethylene glycol  17 g Oral Daily    potassium chloride  40 mEq Oral BID    sodium chloride 0.9%  10 mL Intravenous Q6H    vancomycin (VANCOCIN) IVPB  750 mg Intravenous Q8H     Prn medications: acetaminophen, dextrose 10%, dextrose 10%, glucagon (human recombinant), glucose, glucose, magnesium oxide, magnesium oxide, ondansetron, oxyCODONE, oxyCODONE, promethazine, sodium chloride 0.9%, sodium chloride 0.9%, Flushing PICC Protocol **AND** sodium chloride 0.9% **AND** sodium chloride 0.9%, Pharmacy to dose Vancomycin consult **AND** vancomycin - pharmacy to dose    Antibiotics and Day Number of Therapy:  S/p 1 g Rocephin   D2 Vanc   D2 Flagyl  D2 Zosyn     Lines   PIV    Blood Products:  None     Consults:  Interventional Radiology    Assessment/Plan:       22 y.o.  admitted for IV abx in the setting of suspected PID/TOA, found to have trichomonas. HD2.    -PID/TOA:   - Most recent temp 100.5 at 1659 on , Tmax 102.2F on  at 0943  - Leukocytosis downtrending 29.1 >21.7   - Lactate WNL 0.7  - S/p 1 g Rocephin, D2 Vanc, Zosyn. Flagyl added on last night given presence of trichomonas   - Continue abx until 48 hrs afebrile  - sepsis protocol ordered. Blood cultures NGTD   - continue IV fluid hydration with LR @ 125ml/hr  - F/u AM CBC    -Anemia:  - 10.9/33.6 >8.6/27.8  - Suspect dilutional 2/2 IVF  - Pt is w/o vaginal bleeding, abdomen soft, minimally tender to palpation    -Hypomagnesemia  - 1.50 this AM, will replete with 2g IV  - Repeat CMP tomorrow AM     Disposition:   Anticipated disposition plan: Home   She is not meeting milestones for discharge.   To discharge once TOA adequately treated   Other anticipated discharge needs: None     Will discuss with staff    Nichol Vale MD   [facility] PGY-2

## 2022-09-22 NOTE — PROGRESS NOTES
"Inpatient Nutrition Evaluation    Admit Date: 9/21/2022   Total duration of encounter: 1 day    Nutrition Recommendation/Prescription     Continue Regular diet as ordered  Monitor Weights Weekly   Consider Fe supplementation    Nutrition Assessment     Chart Review    Reason Seen: malnutrition screening tool    Diagnosis:  PID/TOA, Anemia, Hypomagnesemia    Relevant Medical History: cervical dysplasia    Nutrition-Related Medications: LR @ 125 ml/hr, Miralax, KCL, Vanc    Nutrition-Related Labs:  9/22/22 -- H/H 8.6/27 L, Glu 101, K 3.5, BUN 5, Cr 0.65    Diet Order: Diet Adult Regular  Oral Supplement Order: none at this time  Appetite/Oral Intake: good/25-50% of meals  Factors Affecting Nutritional Intake: constipation  Food/Yarsanism/Cultural Preferences: none reported       Wound(s):   skin intact     Comments    9/22/22 -- Pt reports good appetite however not eating much stating "just a lot going on" willing to eat Rasin Bran cereal this am; reports eating good PTA; complains of constipation -- Maralax given, reports feeling "blubbling"    Anthropometrics    Height: 5' (152.4 cm)    Last Weight: 52.2 kg (115 lb) (09/21/22 1400) Weight Method: Bed Scale  BMI (Calculated): 22.5  BMI Classification: normal (BMI 18.5-24.9)     Ideal Body Weight (IBW), Female: 100 lb     % Ideal Body Weight, Female (lb): 115 %                             Usual Weight Provided By:  pt unsure of UBW, no recent wt history available    Wt Readings from Last 5 Encounters:   09/21/22 52.2 kg (115 lb)   10/07/19 54.4 kg (120 lb)   10/07/19 54.4 kg (120 lb)   07/29/19 62.1 kg (137 lb)   07/27/19 62.1 kg (137 lb)     Weight Change(s) Since Admission:  Admit Weight: 54.4 kg (120 lb) (09/21/22 0943)  Pt reports 7-8 lb wt loss x 1 month    Patient Education    Not applicable.    Monitoring & Evaluation     Dietitian will monitor food and beverage intake, weight change, electrolyte/renal panel, and gastrointestinal profile.  Nutrition " Risk/Follow-Up: low (follow-up in 5-7 days)  Patients assigned 'low nutrition risk' status do not qualify for a full nutritional assessment but will be monitored and re-evaluated in a 5-7 day time period.

## 2022-09-23 PROBLEM — N70.93 TUBO-OVARIAN ABSCESS: Status: ACTIVE | Noted: 2022-09-23

## 2022-09-23 LAB
ALBUMIN SERPL-MCNC: 2.5 GM/DL (ref 3.5–5)
ALBUMIN/GLOB SERPL: 0.8 RATIO (ref 1.1–2)
ALP SERPL-CCNC: 82 UNIT/L (ref 40–150)
ALT SERPL-CCNC: 12 UNIT/L (ref 0–55)
AST SERPL-CCNC: 13 UNIT/L (ref 5–34)
BASOPHILS # BLD AUTO: 0.03 X10(3)/MCL (ref 0–0.2)
BASOPHILS NFR BLD AUTO: 0.1 %
BILIRUBIN DIRECT+TOT PNL SERPL-MCNC: 0.4 MG/DL
BUN SERPL-MCNC: 3.4 MG/DL (ref 7–18.7)
CALCIUM SERPL-MCNC: 8.7 MG/DL (ref 8.4–10.2)
CHLORIDE SERPL-SCNC: 109 MMOL/L (ref 98–107)
CO2 SERPL-SCNC: 22 MMOL/L (ref 22–29)
CREAT SERPL-MCNC: 0.59 MG/DL (ref 0.55–1.02)
EOSINOPHIL # BLD AUTO: 0.16 X10(3)/MCL (ref 0–0.9)
EOSINOPHIL NFR BLD AUTO: 0.8 %
ERYTHROCYTE [DISTWIDTH] IN BLOOD BY AUTOMATED COUNT: 14.4 % (ref 11.5–17)
GFR SERPLBLD CREATININE-BSD FMLA CKD-EPI: >60 MLS/MIN/1.73/M2
GLOBULIN SER-MCNC: 3.3 GM/DL (ref 2.4–3.5)
GLUCOSE SERPL-MCNC: 96 MG/DL (ref 74–100)
HCT VFR BLD AUTO: 28.3 % (ref 37–47)
HGB BLD-MCNC: 9.1 GM/DL (ref 12–16)
IMM GRANULOCYTES # BLD AUTO: 0.25 X10(3)/MCL (ref 0–0.04)
IMM GRANULOCYTES NFR BLD AUTO: 1.2 %
LYMPHOCYTES # BLD AUTO: 2 X10(3)/MCL (ref 0.6–4.6)
LYMPHOCYTES NFR BLD AUTO: 9.4 %
MAGNESIUM SERPL-MCNC: 2 MG/DL (ref 1.6–2.6)
MCH RBC QN AUTO: 29.8 PG (ref 27–31)
MCHC RBC AUTO-ENTMCNC: 32.2 MG/DL (ref 33–36)
MCV RBC AUTO: 92.8 FL (ref 80–94)
MONOCYTES # BLD AUTO: 1.39 X10(3)/MCL (ref 0.1–1.3)
MONOCYTES NFR BLD AUTO: 6.5 %
NEUTROPHILS # BLD AUTO: 17.5 X10(3)/MCL (ref 2.1–9.2)
NEUTROPHILS NFR BLD AUTO: 82 %
NRBC BLD AUTO-RTO: 0 %
PHOSPHATE SERPL-MCNC: 1.8 MG/DL (ref 2.3–4.7)
PLATELET # BLD AUTO: 268 X10(3)/MCL (ref 130–400)
PMV BLD AUTO: 12.1 FL (ref 7.4–10.4)
POTASSIUM SERPL-SCNC: 3.6 MMOL/L (ref 3.5–5.1)
PROT SERPL-MCNC: 5.8 GM/DL (ref 6.4–8.3)
RBC # BLD AUTO: 3.05 X10(6)/MCL (ref 4.2–5.4)
SODIUM SERPL-SCNC: 140 MMOL/L (ref 136–145)
VANCOMYCIN TROUGH SERPL-MCNC: 13.1 UG/ML (ref 15–20)
VANCOMYCIN TROUGH SERPL-MCNC: 8.9 UG/ML (ref 15–20)
WBC # SPEC AUTO: 21.3 X10(3)/MCL (ref 4.5–11.5)

## 2022-09-23 PROCEDURE — 63600175 PHARM REV CODE 636 W HCPCS: Performed by: RADIOLOGY

## 2022-09-23 PROCEDURE — 63600175 PHARM REV CODE 636 W HCPCS: Performed by: STUDENT IN AN ORGANIZED HEALTH CARE EDUCATION/TRAINING PROGRAM

## 2022-09-23 PROCEDURE — 87205 SMEAR GRAM STAIN: CPT | Performed by: STUDENT IN AN ORGANIZED HEALTH CARE EDUCATION/TRAINING PROGRAM

## 2022-09-23 PROCEDURE — 80202 ASSAY OF VANCOMYCIN: CPT | Performed by: STUDENT IN AN ORGANIZED HEALTH CARE EDUCATION/TRAINING PROGRAM

## 2022-09-23 PROCEDURE — 94761 N-INVAS EAR/PLS OXIMETRY MLT: CPT

## 2022-09-23 PROCEDURE — 87075 CULTR BACTERIA EXCEPT BLOOD: CPT | Performed by: STUDENT IN AN ORGANIZED HEALTH CARE EDUCATION/TRAINING PROGRAM

## 2022-09-23 PROCEDURE — 84100 ASSAY OF PHOSPHORUS: CPT | Performed by: STUDENT IN AN ORGANIZED HEALTH CARE EDUCATION/TRAINING PROGRAM

## 2022-09-23 PROCEDURE — 83735 ASSAY OF MAGNESIUM: CPT | Performed by: STUDENT IN AN ORGANIZED HEALTH CARE EDUCATION/TRAINING PROGRAM

## 2022-09-23 PROCEDURE — 85025 COMPLETE CBC W/AUTO DIFF WBC: CPT | Performed by: STUDENT IN AN ORGANIZED HEALTH CARE EDUCATION/TRAINING PROGRAM

## 2022-09-23 PROCEDURE — 21400001 HC TELEMETRY ROOM

## 2022-09-23 PROCEDURE — 25000003 PHARM REV CODE 250: Performed by: STUDENT IN AN ORGANIZED HEALTH CARE EDUCATION/TRAINING PROGRAM

## 2022-09-23 PROCEDURE — 25500020 PHARM REV CODE 255

## 2022-09-23 PROCEDURE — 87070 CULTURE OTHR SPECIMN AEROBIC: CPT | Performed by: STUDENT IN AN ORGANIZED HEALTH CARE EDUCATION/TRAINING PROGRAM

## 2022-09-23 PROCEDURE — 80053 COMPREHEN METABOLIC PANEL: CPT | Performed by: STUDENT IN AN ORGANIZED HEALTH CARE EDUCATION/TRAINING PROGRAM

## 2022-09-23 PROCEDURE — 36415 COLL VENOUS BLD VENIPUNCTURE: CPT | Performed by: STUDENT IN AN ORGANIZED HEALTH CARE EDUCATION/TRAINING PROGRAM

## 2022-09-23 PROCEDURE — 25000003 PHARM REV CODE 250: Performed by: RADIOLOGY

## 2022-09-23 PROCEDURE — A4216 STERILE WATER/SALINE, 10 ML: HCPCS | Performed by: STUDENT IN AN ORGANIZED HEALTH CARE EDUCATION/TRAINING PROGRAM

## 2022-09-23 RX ORDER — MIDAZOLAM HYDROCHLORIDE 1 MG/ML
INJECTION INTRAMUSCULAR; INTRAVENOUS
Status: DISCONTINUED | OUTPATIENT
Start: 2022-09-23 | End: 2022-09-25 | Stop reason: HOSPADM

## 2022-09-23 RX ORDER — LIDOCAINE HYDROCHLORIDE 10 MG/ML
INJECTION INFILTRATION; PERINEURAL
Status: DISCONTINUED | OUTPATIENT
Start: 2022-09-23 | End: 2022-09-25 | Stop reason: HOSPADM

## 2022-09-23 RX ORDER — FENTANYL CITRATE 50 UG/ML
INJECTION, SOLUTION INTRAMUSCULAR; INTRAVENOUS
Status: DISCONTINUED | OUTPATIENT
Start: 2022-09-23 | End: 2022-09-24

## 2022-09-23 RX ADMIN — LIDOCAINE HYDROCHLORIDE 10 ML: 10 INJECTION, SOLUTION INFILTRATION; PERINEURAL at 02:09

## 2022-09-23 RX ADMIN — SODIUM CHLORIDE, PRESERVATIVE FREE 10 ML: 5 INJECTION INTRAVENOUS at 05:09

## 2022-09-23 RX ADMIN — VANCOMYCIN HYDROCHLORIDE 750 MG: 750 INJECTION, POWDER, LYOPHILIZED, FOR SOLUTION INTRAVENOUS at 02:09

## 2022-09-23 RX ADMIN — FENTANYL CITRATE 50 MCG: 50 INJECTION INTRAMUSCULAR; INTRAVENOUS at 02:09

## 2022-09-23 RX ADMIN — METRONIDAZOLE 500 MG: 500 TABLET ORAL at 09:09

## 2022-09-23 RX ADMIN — MIDAZOLAM 0.5 MG: 1 INJECTION INTRAMUSCULAR; INTRAVENOUS at 02:09

## 2022-09-23 RX ADMIN — SODIUM CHLORIDE, PRESERVATIVE FREE 10 ML: 5 INJECTION INTRAVENOUS at 02:09

## 2022-09-23 RX ADMIN — POLYETHYLENE GLYCOL 3350 17 G: 17 POWDER, FOR SOLUTION ORAL at 09:09

## 2022-09-23 RX ADMIN — PIPERACILLIN AND TAZOBACTAM 4.5 G: 4; .5 INJECTION, POWDER, LYOPHILIZED, FOR SOLUTION INTRAVENOUS; PARENTERAL at 12:09

## 2022-09-23 RX ADMIN — IOPAMIDOL 100 ML: 755 INJECTION, SOLUTION INTRAVENOUS at 07:09

## 2022-09-23 RX ADMIN — VANCOMYCIN HYDROCHLORIDE 750 MG: 750 INJECTION, POWDER, LYOPHILIZED, FOR SOLUTION INTRAVENOUS at 06:09

## 2022-09-23 RX ADMIN — POTASSIUM CHLORIDE 40 MEQ: 1500 TABLET, EXTENDED RELEASE ORAL at 09:09

## 2022-09-23 RX ADMIN — IBUPROFEN 600 MG: 600 TABLET ORAL at 05:09

## 2022-09-23 RX ADMIN — IBUPROFEN 600 MG: 600 TABLET ORAL at 09:09

## 2022-09-23 RX ADMIN — PIPERACILLIN AND TAZOBACTAM 4.5 G: 4; .5 INJECTION, POWDER, LYOPHILIZED, FOR SOLUTION INTRAVENOUS; PARENTERAL at 04:09

## 2022-09-23 RX ADMIN — IBUPROFEN 600 MG: 600 TABLET ORAL at 03:09

## 2022-09-23 RX ADMIN — OXYCODONE HYDROCHLORIDE 10 MG: 5 TABLET ORAL at 05:09

## 2022-09-23 RX ADMIN — VANCOMYCIN HYDROCHLORIDE 750 MG: 750 INJECTION, POWDER, LYOPHILIZED, FOR SOLUTION INTRAVENOUS at 09:09

## 2022-09-23 RX ADMIN — PIPERACILLIN AND TAZOBACTAM 4.5 G: 4; .5 INJECTION, POWDER, LYOPHILIZED, FOR SOLUTION INTRAVENOUS; PARENTERAL at 09:09

## 2022-09-23 NOTE — PROGRESS NOTES
[facility] INPATIENT NOTE    Subjective:       Beatriz Arceo is a 22 y.o.  admitted for IV abx in the setting of suspected PID/TOA, found to have trichomonas. HD3.     Patient resting in bed, states that abdominal pain is similar to yesterday. Received oxycodone 10 mg about an hour prior to AM assessment. No fevers/chills, N/V. Ambulating and voiding spontaneously. Was kept NPO starting at 0000 pending potential surgical intervention this AM.     Objective:     VITAL SIGNS: 24 HR MIN & MAX Most Recent Vitals 24 HR Intake & Output   Temp  Min: 97.8 °F (36.6 °C)  Max: 100.5 °F (38.1 °C)  98.4 °F (36.9 °C)  07 -  0700  In: 300 [I.V.:50]  Out: -    BP  Min: 105/65  Max: 130/81  110/71     Pulse  Min: 84  Max: 107  90     Resp  Min: 18  Max: 20  18     SpO2  Min: 99 %  Max: 100 %  100 %       BP  Min: 105/65  Max: 130/81  Temp  Av.1 °F (37.3 °C)  Min: 97.8 °F (36.6 °C)  Max: 100.5 °F (38.1 °C)  Pulse  Av.7  Min: 84  Max: 107  Resp  Av.2  Min: 18  Max: 20  SpO2  Av.9 %  Min: 99 %  Max: 100 %    Body mass index is 22.46 kg/m².    I/O last 3 completed shifts:  In: 300 [I.V.:50; IV Piggyback:250]  Out: -       Intake/Output Summary (Last 24 hours) at 2022 0718  Last data filed at 2022 1428  Gross per 24 hour   Intake 300 ml   Output --   Net 300 ml         Physical Exam:   Gen: Alert, cooperative, no distress, appears stated age  CV: RRR  Chest: CTABL, no wheezes/rales/rhonchi  Abdomen: Soft, b/l LLQ and RLQ tenderness to palpation, no masses. No guarding or rebound.   Extrem: Extremities normal, atraumatic, no cyanosis or edema.  No calf tenderness or erythema.  External genitalia: Normal female genetalia without lesion.      Labs:   Recent Labs   Lab 22  0409 22  0427 22  0306   WBC 29.1* 21.7* 21.3*   HGB 10.9* 8.6* 9.1*   HCT 33.6* 27.8* 28.3*    224 268   MCV 91.3 93.0 92.8   RDW 13.9 14.2 14.4   * 139 140   K 2.7* 3.5 3.6   CO2 19* 20*  22   BUN 4.4* 5.0* 3.4*   CREATININE 0.71 0.65 0.59   CALCIUM 10.2 8.9 8.7   ALBUMIN 4.1 2.5* 2.5*   BILITOT 1.3 0.7 0.4   AST 11 10 13   ALT 5 6 12   ALKPHOS 67 54 82       Recent Results (from the past 24 hour(s))   Vancomycin, Random    Collection Time: 09/22/22 11:04 AM   Result Value Ref Range    Vanc Lvl Random 3.8 (L) 15.0 - 20.0 ug/ml   Comprehensive metabolic panel (daily)    Collection Time: 09/23/22  3:06 AM   Result Value Ref Range    Sodium Level 140 136 - 145 mmol/L    Potassium Level 3.6 3.5 - 5.1 mmol/L    Chloride 109 (H) 98 - 107 mmol/L    Carbon Dioxide 22 22 - 29 mmol/L    Glucose Level 96 74 - 100 mg/dL    Blood Urea Nitrogen 3.4 (L) 7.0 - 18.7 mg/dL    Creatinine 0.59 0.55 - 1.02 mg/dL    Calcium Level Total 8.7 8.4 - 10.2 mg/dL    Protein Total 5.8 (L) 6.4 - 8.3 gm/dL    Albumin Level 2.5 (L) 3.5 - 5.0 gm/dL    Globulin 3.3 2.4 - 3.5 gm/dL    Albumin/Globulin Ratio 0.8 (L) 1.1 - 2.0 ratio    Bilirubin Total 0.4 <=1.5 mg/dL    Alkaline Phosphatase 82 40 - 150 unit/L    Alanine Aminotransferase 12 0 - 55 unit/L    Aspartate Aminotransferase 13 5 - 34 unit/L    eGFR >60 mls/min/1.73/m2   Magnesium - Daily    Collection Time: 09/23/22  3:06 AM   Result Value Ref Range    Magnesium Level 2.00 1.60 - 2.60 mg/dL   Phosphorus -Daily    Collection Time: 09/23/22  3:06 AM   Result Value Ref Range    Phosphorus Level 1.8 (L) 2.3 - 4.7 mg/dL   VANCOMYCIN, TROUGH    Collection Time: 09/23/22  3:06 AM   Result Value Ref Range    Vancomycin Trough 13.1 (L) 15.0 - 20.0 ug/ml   CBC with Differential    Collection Time: 09/23/22  3:06 AM   Result Value Ref Range    WBC 21.3 (H) 4.5 - 11.5 x10(3)/mcL    RBC 3.05 (L) 4.20 - 5.40 x10(6)/mcL    Hgb 9.1 (L) 12.0 - 16.0 gm/dL    Hct 28.3 (L) 37.0 - 47.0 %    MCV 92.8 80.0 - 94.0 fL    MCH 29.8 27.0 - 31.0 pg    MCHC 32.2 (L) 33.0 - 36.0 mg/dL    RDW 14.4 11.5 - 17.0 %    Platelet 268 130 - 400 x10(3)/mcL    MPV 12.1 (H) 7.4 - 10.4 fL    Neut % 82.0 %    Lymph % 9.4 %     Mono % 6.5 %    Eos % 0.8 %    Basophil % 0.1 %    Lymph # 2.00 0.6 - 4.6 x10(3)/mcL    Neut # 17.5 (H) 2.1 - 9.2 x10(3)/mcL    Mono # 1.39 (H) 0.1 - 1.3 x10(3)/mcL    Eos # 0.16 0 - 0.9 x10(3)/mcL    Baso # 0.03 0 - 0.2 x10(3)/mcL    IG# 0.25 (H) 0 - 0.04 x10(3)/mcL    IG% 1.2 %    NRBC% 0.0 %       Microbiology Data:  No results for input(s): CULTURE, SARSCOV2, INFLUENZAA, INFLUENZAB in the last 168 hours.    Invalid input(s): CURINE, CBLOOD, CULTURERESUL, CRESPWSM, SPECIMENDESCRIPTION, CCSF, GRAMSMEAR, CHLAMDNA, NGONDNA, RSVPCR     Radiology:  CT Abdomen Pelvis With Contrast    Result Date: 9/21/2022  EXAMINATION CT ABDOMEN PELVIS WITH CONTRAST CLINICAL HISTORY Abdominal pain, acute, nonlocalized; TECHNIQUE Post-contrast helical-acquisition CT images were obtained and multiplanar reformats accomplished by a CT technologist at a separate workstation, pushed to PACS for physician review. CONTRAST *IV: ISOVUE-370, 100 mL *Enteric: none COMPARISON 7 October 2019 FINDINGS Images were reviewed in soft tissue, lung, and bone windows. Exam quality: adequate for evaluation Lines/tubes: none visualized Cardiopulmonary: Visualized heart chambers are normal volume.  No acute or focal abnormality of the included lower lung zones.  No significant pericardial or pleural fluid. Hepatobiliary/Pancreas: No acute or focal liver abnormality.  Gallbladder is unremarkable.  No convincing obstructive biliary process.  There is no evidence of expansile pancreatic lesion, ductal dilatation, or peripancreatic inflammatory changes. Spleen: No acute or focal abnormality. Adrenals/: No suspicious adrenal or renal lesion. No radiopaque urolithiasis or evidence of distal obstructive uropathy.  There is diffusely thickened appearance of the urinary bladder wall, with no focal mural irregularity or convincing intraluminal abnormality.  Diffusely thickened and heterogeneous appearance of the cervix is noted, with small amount of air through  the vaginal and cervical canal. There are complex cystic bilateral adnexal masses measuring 3 cm x 3 cm on right and 7 cm x 4.7 cm on left.  Neither ovary ovaries is convincingly visualized separate from the structures.  Mild surrounding peripelvic fat stranding and free fluid are also noted. Esophagus/GI tract: The included lower esophagus is unremarkable.  No evidence of gastric outlet or small bowel obstruction. There is appearance of scattered small bowel wall thickening at the left lower abdomen, which may be related to contiguous inflammatory changes due to adjacent adnexal findings mentioned above.  The appendix is unremarkable.  No focal abnormality of the colon is appreciated, although there is somewhat thickened appearance of the rectal wall. Peritoneal/Extraperitoneal Spaces: No free intra-abdominal air or drainable collections are identified.  No focal abnormality of the regional vascular structures is identified.  No pathologic carito enlargement or necrotic process. Musculoskeletal: No acute process or suspicious focal abnormality. IMPRESSION 1. Irregular cystic bilateral adnexal masses with surrounding inflammatory changes, overall appearance nonspecific by CT assessment.  Differential includes endometriomas or infectious etiology; neoplastic disease is less favored given patient age, but not entirely excluded. 2. Diffusely thickened appearance of the bladder wall may reflect changes of cystitis. 3. Prominent appearance of left lower quadrant small bowel wall and distal rectal wall may be secondary to adjacent adnexal inflammatory changes.  Possibility of acute enteritis/proctitis not excluded. ========== No significant discrepancy identified in relation to the teleradiology preliminary report. RADIATION DOSE Automated tube current modulation, weight-based exposure dosing, and/or iterative reconstruction technique utilized to reach lowest reasonably achievable exposure rate. DLP: 341 mGy*cm  Electronically signed by: Rj Alli Date:    09/21/2022 Time:    07:37    US Pelvis Comp with Transvag NON-OB (xpd    Result Date: 9/21/2022  EXAMINATION: US PELVIS COMP WITH TRANSVAG NON-OB (XPD) CLINICAL HISTORY: Pelvic pain; TECHNIQUE: Exam is performed both transabdominally and endovaginally COMPARISON: None FINDINGS: The uterus measures 12.5 x 4.2 x 5.3 cm. The endometrial stripe is 1 cm. The right ovary is only seen transabdominally. It measures 4.1 x 2 x 2.9 cm. There is a cyst present measuring 1.8 x 1.2 x 2 cm. There is flow present. There are nabothian cyst present.  There is a small amount of free fluid in the cul de sac. The left ovary measures 7.3 x 4.6 x 6.2 cm..  This is felt to represent both the ovary and a complex fluid collection adjacent to the ovary.  The 2 are very difficult to separate.  This is most consistent with a hemorrhagic or infected cyst.  An endometrioma cannot be ruled out with certainty.  The flow is hyperemic.     Complex lesion adjacent to the left ovary.  Differential would include hemorrhagic and infected cyst.  Endometrioma cannot be ruled out with certainty.  TOA should be considered. Electronically signed by: Primo Farrar MD Date:    09/21/2022 Time:    08:43      Medications  Home medications  Medications Prior to Admission   Medication Sig Dispense Refill Last Dose    diclofenac (VOLTAREN) 75 MG EC tablet Take 1 tablet (75 mg total) by mouth 2 (two) times daily as needed (Pain). 30 tablet 0     HYDROcodone-acetaminophen (NORCO) 5-325 mg per tablet Take 1 tablet by mouth every 6 (six) hours as needed for Pain. 12 tablet 0     hyoscyamine (LEVSIN/SL) 0.125 mg Subl Place 1 tablet (0.125 mg total) under the tongue every 4 (four) hours as needed (Abdominal cramping/bladder spasms). 30 tablet 0      Current Inpatient medications  Scheduled   ibuprofen  600 mg Oral Q6H    metroNIDAZOLE  500 mg Oral Q12H    piperacillin-tazobactam (ZOSYN) IVPB  4.5 g Intravenous Q8H     polyethylene glycol  17 g Oral Daily    potassium chloride  40 mEq Oral BID    sodium chloride 0.9%  10 mL Intravenous Q6H    vancomycin (VANCOCIN) IVPB  750 mg Intravenous Q8H     Prn medications: acetaminophen, dextrose 10%, dextrose 10%, glucagon (human recombinant), glucose, glucose, magnesium oxide, magnesium oxide, ondansetron, oxyCODONE, oxyCODONE, promethazine, sodium chloride 0.9%, sodium chloride 0.9%, Flushing PICC Protocol **AND** sodium chloride 0.9% **AND** sodium chloride 0.9%, Pharmacy to dose Vancomycin consult **AND** vancomycin - pharmacy to dose    Antibiotics and Day Number of Therapy:  S/p 1 g Rocephin   D3 Vanc   D3 Flagyl  D3 Zosyn     Lines   PIV    Blood Products:  None     Consults:  Interventional Radiology    Assessment/Plan:       22 y.o.  admitted for IV abx in the setting of suspected PID/TOA, found to have trichomonas. HD3.    -PID/TOA:   - Most recent temp 100.5 at 1659 on , Tmax 102.2F on  at 0943  - Leukocytosis stable 29.1 >21.7 >21.3  - Lactate WNL 0.7  - S/p 1 g Rocephin, D2 Vanc, Zosyn, Flagyl.   - Continue abx until 48 hrs afebrile  - sepsis protocol ordered. Blood cultures NGTD   - Given that leukocytosis not improving, abdominal exam unchanged, STAT CT A/P ordered for this AM. Will consult IR for possible drainage. If unable to be drained, will take to OR for washout.   - continue IV fluid hydration with LR @ 125ml/hr  - F/u AM CBC    -Anemia:  - 10.9/33.6 >8.6/27.8>9.1/28.3  - Suspect dilutional 2/2 IVF    -Hypomagnesemia  - 1.50 > 2g IV Mg > 2.00    - Hypophosphatemia   - 2.6 >1.8  - Will replete following intervention this AM     Disposition:   Anticipated disposition plan: Home   She is not meeting milestones for discharge.   To discharge once TOA adequately treated   Other anticipated discharge needs: None     Will discuss with staff    Nichol Vale MD   [facility] PGY-2

## 2022-09-23 NOTE — SEDATION DOCUMENTATION
~60 mls purulent drainage obtained from drain to LLQ just left of midline and near pelvic region. Connected to OSCAR drain with bulb and bag attached.

## 2022-09-23 NOTE — PROGRESS NOTES
MD to bedside for PM rounds. Pt s/p IR drain for  TOA. Pt states she feels well, better than prior to procedure. States she feels tired from sedation. States she still I snot hungry. Afebrile today.     Vitals:    09/23/22 0834 09/23/22 1222 09/23/22 1415 09/23/22 1515   BP: 120/78 129/83 127/84    Pulse: 85 93 92 92   Resp: 18 18 (!) 22    Temp: 98.3 °F (36.8 °C) 99.3 °F (37.4 °C)  98.8 °F (37.1 °C)   TempSrc: Oral Oral  Oral   SpO2: 100% 100% 95% 100%   Weight:       Height:        EXAM:   Gen: tired appearing, NAD  CV: RRR  Pulm: CTAB  Abd: minimal TTP RLQ, nontender other quadrants; OSCAR drain in place at LLQ  Ext: no evidence SCDs    A/P:   Continue IV abx, follow up fluid cultures  Replete electrolytes PRN to goal Mg/K/Phos 2/3/4    Magi Mcelroy MD, MPH  [facility], PGY3

## 2022-09-23 NOTE — PROGRESS NOTES
Pharmacokinetic Assessment Follow Up: IV Vancomycin    Vancomycin serum concentration assessment(s):    The trough level was drawn correctly and can be used to guide therapy at this time. The measurement is within the desired definitive target range of 10 to 20 mcg/mL.    Vancomycin Regimen Plan:    Continue regimen to Vancomycin 750 mg IV every 8 hours with next serum trough concentration measured at 0500 prior to 4th dose on 9/24    Drug levels (last 3 results):  Recent Labs   Lab Result Units 09/22/22  1104 09/23/22  0306   Vanc Lvl Random ug/ml 3.8*  --    Vancomycin Trough ug/ml  --  13.1*       Pharmacy will continue to follow and monitor vancomycin.    Please contact pharmacy at extension [phone] for questions regarding this assessment.    Thank you for the consult,   Bg Stanton       Patient brief summary:  Beatriz Arceo is a 22 y.o. female initiated on antimicrobial therapy with IV Vancomycin for treatment of intra-abdominal infection    The patient's current regimen is 750mg q8hr    Drug Allergies:   Review of patient's allergies indicates:  No Known Allergies    Actual Body Weight:   52.2    Renal Function:   Estimated Creatinine Clearance: 105.6 mL/min (based on SCr of 0.59 mg/dL).,     Dialysis Method (if applicable):  N/A    CBC (last 72 hours):  Recent Labs   Lab Result Units 09/21/22  0409 09/22/22  0427 09/23/22  0306   WBC x10(3)/mcL 29.1* 21.7* 21.3*   Hgb gm/dL 10.9* 8.6* 9.1*   Hct % 33.6* 27.8* 28.3*   Platelet x10(3)/mcL 276 224 268   Mono % % 7.3  --  6.5   Monocyte Man %  --  2  --    Eos % % 0.0  --  0.8   Basophil % % 0.1  --  0.1       Metabolic Panel (last 72 hours):  Recent Labs   Lab Result Units 09/21/22  0329 09/21/22  0409 09/21/22  0529 09/22/22  0427 09/23/22  0306   Sodium Level mmol/L  --  135*  --  139 140   Potassium Level mmol/L  --  2.7*  --  3.5 3.6   Chloride mmol/L  --  98  --  108* 109*   Carbon Dioxide mmol/L  --  19*  --  20* 22   Glucose Level mg/dL  --  108*  --   101* 96   Glucose, UA mg/dL Normal  --   --   --   --    Blood Urea Nitrogen mg/dL  --  4.4*  --  5.0* 3.4*   Creatinine mg/dL  --  0.71  --  0.65 0.59   Albumin Level gm/dL  --  4.1  --  2.5* 2.5*   Bilirubin Total mg/dL  --  1.3  --  0.7 0.4   Alkaline Phosphatase unit/L  --  67  --  54 82   Aspartate Aminotransferase unit/L  --  11  --  10 13   Alanine Aminotransferase unit/L  --  5  --  6 12   Magnesium Level mg/dL  --   --  1.80 1.50* 2.00   Phosphorus Level mg/dL  --   --  3.3 2.6 1.8*       Vancomycin Administrations:  vancomycin given in the last 96 hours                     vancomycin 750 mg in sodium chloride 0.9% 250 mL IVPB (mg) 750 mg New Bag 09/22/22 2025     750 mg New Bag  1328    vancomycin 750 mg in sodium chloride 0.9% 250 mL IVPB (mg) 750 mg New Bag 09/21/22 2333    vancomycin (VANCOCIN) 1,000 mg in sodium chloride 0.9% 250 mL IVPB (mg) 1,000 mg New Bag 09/21/22 1119                    Microbiologic Results:  Microbiology Results (last 7 days)       Procedure Component Value Units Date/Time    Blood Culture [[id number]]  (Normal) Collected: 09/21/22 0817    Order Status: Completed Specimen: Blood Updated: 09/22/22 1500     CULTURE, BLOOD ([facility]) No Growth At 24 Hours    Blood Culture [[id number]]  (Normal) Collected: 09/21/22 0957    Order Status: Completed Specimen: Blood Updated: 09/22/22 1500     CULTURE, BLOOD ([facility]) No Growth At 24 Hours    Blood Culture [[id number]]  (Normal) Collected: 09/21/22 0817    Order Status: Completed Specimen: Blood Updated: 09/22/22 1500     CULTURE, BLOOD ([facility]) No Growth At 24 Hours    Blood Culture [[id number]]     Order Status: Canceled Specimen: Blood     Chlamydia/GC, PCR [[id number]]  (Normal) Collected: 09/21/22 0329    Order Status: Completed Specimen: Urine Updated: 09/21/22 0918     Chlamydia trachomatis PCR Not Detected     N. gonorrhea PCR Not Detected    Wet Prep, Genital [[id number]]  (Abnormal) Collected: 09/21/22 0741    Order Status: Completed Specimen:  Genital from Cervicovaginal Updated: 09/21/22 0751     WBC, Wet Prep Many     Clue Cells, Wet Prep None Seen     Trichomonas, Wet Prep Few     Yeast, Wet Prep None Seen    Trichomonas Prep Wet Mount [[id number]]     Order Status: Canceled Specimen: Vagina

## 2022-09-24 LAB
ALBUMIN SERPL-MCNC: 2.4 GM/DL (ref 3.5–5)
ALBUMIN/GLOB SERPL: 0.7 RATIO (ref 1.1–2)
ALP SERPL-CCNC: 61 UNIT/L (ref 40–150)
ALT SERPL-CCNC: 10 UNIT/L (ref 0–55)
AST SERPL-CCNC: 15 UNIT/L (ref 5–34)
BASOPHILS # BLD AUTO: 0.04 X10(3)/MCL (ref 0–0.2)
BASOPHILS NFR BLD AUTO: 0.2 %
BILIRUBIN DIRECT+TOT PNL SERPL-MCNC: 0.4 MG/DL
BUN SERPL-MCNC: <3 MG/DL (ref 7–18.7)
CALCIUM SERPL-MCNC: 8.8 MG/DL (ref 8.4–10.2)
CHLORIDE SERPL-SCNC: 107 MMOL/L (ref 98–107)
CO2 SERPL-SCNC: 20 MMOL/L (ref 22–29)
CREAT SERPL-MCNC: 0.56 MG/DL (ref 0.55–1.02)
EOSINOPHIL # BLD AUTO: 0.14 X10(3)/MCL (ref 0–0.9)
EOSINOPHIL NFR BLD AUTO: 0.7 %
ERYTHROCYTE [DISTWIDTH] IN BLOOD BY AUTOMATED COUNT: 14.5 % (ref 11.5–17)
GFR SERPLBLD CREATININE-BSD FMLA CKD-EPI: >60 MLS/MIN/1.73/M2
GLOBULIN SER-MCNC: 3.4 GM/DL (ref 2.4–3.5)
GLUCOSE SERPL-MCNC: 76 MG/DL (ref 74–100)
GRAM STN SPEC: NORMAL
GRAM STN SPEC: NORMAL
HCT VFR BLD AUTO: 29 % (ref 37–47)
HGB BLD-MCNC: 9.2 GM/DL (ref 12–16)
IMM GRANULOCYTES # BLD AUTO: 0.16 X10(3)/MCL (ref 0–0.04)
IMM GRANULOCYTES NFR BLD AUTO: 0.8 %
LYMPHOCYTES # BLD AUTO: 2.15 X10(3)/MCL (ref 0.6–4.6)
LYMPHOCYTES NFR BLD AUTO: 11.1 %
MAGNESIUM SERPL-MCNC: 1.8 MG/DL (ref 1.6–2.6)
MCH RBC QN AUTO: 29.4 PG (ref 27–31)
MCHC RBC AUTO-ENTMCNC: 31.7 MG/DL (ref 33–36)
MCV RBC AUTO: 92.7 FL (ref 80–94)
MONOCYTES # BLD AUTO: 1.32 X10(3)/MCL (ref 0.1–1.3)
MONOCYTES NFR BLD AUTO: 6.8 %
NEUTROPHILS # BLD AUTO: 15.5 X10(3)/MCL (ref 2.1–9.2)
NEUTROPHILS NFR BLD AUTO: 80.4 %
NRBC BLD AUTO-RTO: 0 %
PHOSPHATE SERPL-MCNC: 2.6 MG/DL (ref 2.3–4.7)
PLATELET # BLD AUTO: 303 X10(3)/MCL (ref 130–400)
PMV BLD AUTO: 11.6 FL (ref 7.4–10.4)
POTASSIUM SERPL-SCNC: 4 MMOL/L (ref 3.5–5.1)
PROT SERPL-MCNC: 5.8 GM/DL (ref 6.4–8.3)
RBC # BLD AUTO: 3.13 X10(6)/MCL (ref 4.2–5.4)
SODIUM SERPL-SCNC: 138 MMOL/L (ref 136–145)
VANCOMYCIN TROUGH SERPL-MCNC: 12.4 UG/ML (ref 15–20)
WBC # SPEC AUTO: 19.3 X10(3)/MCL (ref 4.5–11.5)

## 2022-09-24 PROCEDURE — 36415 COLL VENOUS BLD VENIPUNCTURE: CPT | Performed by: STUDENT IN AN ORGANIZED HEALTH CARE EDUCATION/TRAINING PROGRAM

## 2022-09-24 PROCEDURE — 25000003 PHARM REV CODE 250: Performed by: STUDENT IN AN ORGANIZED HEALTH CARE EDUCATION/TRAINING PROGRAM

## 2022-09-24 PROCEDURE — 84100 ASSAY OF PHOSPHORUS: CPT | Performed by: STUDENT IN AN ORGANIZED HEALTH CARE EDUCATION/TRAINING PROGRAM

## 2022-09-24 PROCEDURE — 80053 COMPREHEN METABOLIC PANEL: CPT | Performed by: STUDENT IN AN ORGANIZED HEALTH CARE EDUCATION/TRAINING PROGRAM

## 2022-09-24 PROCEDURE — 83735 ASSAY OF MAGNESIUM: CPT | Performed by: STUDENT IN AN ORGANIZED HEALTH CARE EDUCATION/TRAINING PROGRAM

## 2022-09-24 PROCEDURE — 87070 CULTURE OTHR SPECIMN AEROBIC: CPT | Performed by: STUDENT IN AN ORGANIZED HEALTH CARE EDUCATION/TRAINING PROGRAM

## 2022-09-24 PROCEDURE — 87205 SMEAR GRAM STAIN: CPT | Performed by: STUDENT IN AN ORGANIZED HEALTH CARE EDUCATION/TRAINING PROGRAM

## 2022-09-24 PROCEDURE — A4216 STERILE WATER/SALINE, 10 ML: HCPCS | Performed by: STUDENT IN AN ORGANIZED HEALTH CARE EDUCATION/TRAINING PROGRAM

## 2022-09-24 PROCEDURE — 21400001 HC TELEMETRY ROOM

## 2022-09-24 PROCEDURE — 94761 N-INVAS EAR/PLS OXIMETRY MLT: CPT

## 2022-09-24 PROCEDURE — 87075 CULTR BACTERIA EXCEPT BLOOD: CPT | Performed by: STUDENT IN AN ORGANIZED HEALTH CARE EDUCATION/TRAINING PROGRAM

## 2022-09-24 PROCEDURE — 85025 COMPLETE CBC W/AUTO DIFF WBC: CPT | Performed by: STUDENT IN AN ORGANIZED HEALTH CARE EDUCATION/TRAINING PROGRAM

## 2022-09-24 PROCEDURE — 80202 ASSAY OF VANCOMYCIN: CPT | Performed by: STUDENT IN AN ORGANIZED HEALTH CARE EDUCATION/TRAINING PROGRAM

## 2022-09-24 PROCEDURE — 63600175 PHARM REV CODE 636 W HCPCS: Performed by: STUDENT IN AN ORGANIZED HEALTH CARE EDUCATION/TRAINING PROGRAM

## 2022-09-24 RX ORDER — HYDROXYZINE PAMOATE 25 MG/1
25 CAPSULE ORAL EVERY 6 HOURS PRN
Status: DISCONTINUED | OUTPATIENT
Start: 2022-09-24 | End: 2022-09-25 | Stop reason: HOSPADM

## 2022-09-24 RX ADMIN — VANCOMYCIN HYDROCHLORIDE 1000 MG: 1 INJECTION, POWDER, LYOPHILIZED, FOR SOLUTION INTRAVENOUS at 09:09

## 2022-09-24 RX ADMIN — SODIUM CHLORIDE, PRESERVATIVE FREE 10 ML: 5 INJECTION INTRAVENOUS at 09:09

## 2022-09-24 RX ADMIN — IBUPROFEN 600 MG: 600 TABLET ORAL at 09:09

## 2022-09-24 RX ADMIN — VANCOMYCIN HYDROCHLORIDE 1000 MG: 1 INJECTION, POWDER, LYOPHILIZED, FOR SOLUTION INTRAVENOUS at 02:09

## 2022-09-24 RX ADMIN — PIPERACILLIN AND TAZOBACTAM 4.5 G: 4; .5 INJECTION, POWDER, LYOPHILIZED, FOR SOLUTION INTRAVENOUS; PARENTERAL at 12:09

## 2022-09-24 RX ADMIN — PIPERACILLIN AND TAZOBACTAM 4.5 G: 4; .5 INJECTION, POWDER, LYOPHILIZED, FOR SOLUTION INTRAVENOUS; PARENTERAL at 05:09

## 2022-09-24 RX ADMIN — IBUPROFEN 600 MG: 600 TABLET ORAL at 06:09

## 2022-09-24 RX ADMIN — METRONIDAZOLE 500 MG: 500 TABLET ORAL at 08:09

## 2022-09-24 RX ADMIN — METRONIDAZOLE 500 MG: 500 TABLET ORAL at 09:09

## 2022-09-24 RX ADMIN — POTASSIUM CHLORIDE 40 MEQ: 1500 TABLET, EXTENDED RELEASE ORAL at 08:09

## 2022-09-24 RX ADMIN — PIPERACILLIN AND TAZOBACTAM 4.5 G: 4; .5 INJECTION, POWDER, LYOPHILIZED, FOR SOLUTION INTRAVENOUS; PARENTERAL at 09:09

## 2022-09-24 RX ADMIN — SODIUM CHLORIDE, PRESERVATIVE FREE 10 ML: 5 INJECTION INTRAVENOUS at 06:09

## 2022-09-24 RX ADMIN — SODIUM CHLORIDE, PRESERVATIVE FREE 10 ML: 5 INJECTION INTRAVENOUS at 12:09

## 2022-09-24 RX ADMIN — ONDANSETRON 8 MG: 4 TABLET, ORALLY DISINTEGRATING ORAL at 05:09

## 2022-09-24 RX ADMIN — IBUPROFEN 600 MG: 600 TABLET ORAL at 02:09

## 2022-09-24 RX ADMIN — SODIUM CHLORIDE, PRESERVATIVE FREE 10 ML: 5 INJECTION INTRAVENOUS at 02:09

## 2022-09-24 RX ADMIN — HYDROXYZINE PAMOATE 25 MG: 25 CAPSULE ORAL at 09:09

## 2022-09-24 RX ADMIN — VANCOMYCIN HYDROCHLORIDE 1000 MG: 1 INJECTION, POWDER, LYOPHILIZED, FOR SOLUTION INTRAVENOUS at 06:09

## 2022-09-24 NOTE — PROGRESS NOTES
Pharmacokinetic Assessment Follow Up: IV Vancomycin    Vancomycin serum concentration assessment(s):    The trough level was drawn correctly and can be used to guide therapy at this time. The measurement is below the desired definitive target range of 10 to 20 mcg/mL.    Vancomycin Regimen Plan:    Change regimen to Vancomycin 1000 mg mg IV every 8 hours with next serum trough concentration measured at 2100 prior to 3rd dose on 09/24/2022    Drug levels (last 3 results):  Recent Labs   Lab Result Units 09/22/22  1104 09/23/22  0306 09/23/22  2111   Vanc Lvl Random ug/ml 3.8*  --   --    Vancomycin Trough ug/ml  --  13.1* 8.9*       Pharmacy will continue to follow and monitor vancomycin.    Please contact pharmacy at extension [phone] for questions regarding this assessment.    Thank you for the consult,   Johanne Kruger       Patient brief summary:  Beatriz Arceo is a 22 y.o. female initiated on antimicrobial therapy with IV Vancomycin for treatment of intra-abdominal infection    The patient's current regimen is Vanc 750 mg IVPB q8h    Drug Allergies:   Review of patient's allergies indicates:  No Known Allergies    Actual Body Weight:   52.2 kg    Renal Function:   Estimated Creatinine Clearance: 105.6 mL/min (based on SCr of 0.59 mg/dL).,     Dialysis Method (if applicable):  N/A    CBC (last 72 hours):  Recent Labs   Lab Result Units 09/21/22  0409 09/22/22  0427 09/23/22  0306   WBC x10(3)/mcL 29.1* 21.7* 21.3*   Hgb gm/dL 10.9* 8.6* 9.1*   Hct % 33.6* 27.8* 28.3*   Platelet x10(3)/mcL 276 224 268   Mono % % 7.3  --  6.5   Monocyte Man %  --  2  --    Eos % % 0.0  --  0.8   Basophil % % 0.1  --  0.1       Metabolic Panel (last 72 hours):  Recent Labs   Lab Result Units 09/21/22  0329 09/21/22  0409 09/21/22  0529 09/22/22  0427 09/23/22  0306   Sodium Level mmol/L  --  135*  --  139 140   Potassium Level mmol/L  --  2.7*  --  3.5 3.6   Chloride mmol/L  --  98  --  108* 109*   Carbon Dioxide mmol/L  --  19*   --  20* 22   Glucose Level mg/dL  --  108*  --  101* 96   Glucose, UA mg/dL Normal  --   --   --   --    Blood Urea Nitrogen mg/dL  --  4.4*  --  5.0* 3.4*   Creatinine mg/dL  --  0.71  --  0.65 0.59   Albumin Level gm/dL  --  4.1  --  2.5* 2.5*   Bilirubin Total mg/dL  --  1.3  --  0.7 0.4   Alkaline Phosphatase unit/L  --  67  --  54 82   Aspartate Aminotransferase unit/L  --  11  --  10 13   Alanine Aminotransferase unit/L  --  5  --  6 12   Magnesium Level mg/dL  --   --  1.80 1.50* 2.00   Phosphorus Level mg/dL  --   --  3.3 2.6 1.8*       Vancomycin Administrations:  vancomycin given in the last 96 hours                     vancomycin 750 mg in sodium chloride 0.9% 250 mL IVPB (mg) 750 mg New Bag 09/23/22 2113     750 mg New Bag  1412     750 mg New Bag  0600     750 mg New Bag 09/22/22 2025     750 mg New Bag  1328    vancomycin 750 mg in sodium chloride 0.9% 250 mL IVPB (mg) 750 mg New Bag 09/21/22 2333    vancomycin (VANCOCIN) 1,000 mg in sodium chloride 0.9% 250 mL IVPB (mg) 1,000 mg New Bag 09/21/22 1119                    Microbiologic Results:  Microbiology Results (last 7 days)       Procedure Component Value Units Date/Time    Body Fluid Culture [[id number]] Collected: 09/23/22 1435    Order Status: Sent Specimen: Body Fluid from Pelvis Updated: 09/23/22 1615    Blood Culture [[id number]]  (Normal) Collected: 09/21/22 0817    Order Status: Completed Specimen: Blood Updated: 09/23/22 1500     CULTURE, BLOOD ([facility]) No Growth At 48 Hours    Blood Culture [[id number]]  (Normal) Collected: 09/21/22 0817    Order Status: Completed Specimen: Blood Updated: 09/23/22 1500     CULTURE, BLOOD ([facility]) No Growth At 48 Hours    Blood Culture [[id number]]  (Normal) Collected: 09/21/22 0957    Order Status: Completed Specimen: Blood Updated: 09/23/22 1500     CULTURE, BLOOD ([facility]) No Growth At 48 Hours    Gram Stain [[id number]]     Order Status: Sent Specimen: Abscess from Pelvis     Wound Culture [[id number]]     Order  Status: Sent Specimen: Abscess from Pelvis     Anaerobic Culture [[id number]]     Order Status: Sent Specimen: Abscess from Pelvis     Blood Culture [[id number]]     Order Status: Canceled Specimen: Blood     Chlamydia/GC, PCR [[id number]]  (Normal) Collected: 09/21/22 0329    Order Status: Completed Specimen: Urine Updated: 09/21/22 0918     Chlamydia trachomatis PCR Not Detected     N. gonorrhea PCR Not Detected    Wet Prep, Genital [[id number]]  (Abnormal) Collected: 09/21/22 0741    Order Status: Completed Specimen: Genital from Cervicovaginal Updated: 09/21/22 0751     WBC, Wet Prep Many     Clue Cells, Wet Prep None Seen     Trichomonas, Wet Prep Few     Yeast, Wet Prep None Seen    Trichomonas Prep Wet Mount [[id number]]     Order Status: Canceled Specimen: Vagina

## 2022-09-24 NOTE — PROGRESS NOTES
[facility] INPATIENT NOTE    Subjective:       Beatriz Arceo is a 22 y.o.  admitted for IV abx in the setting of suspected PID/TOA, found to have trichomonas. Now s/p drain placement with IR yesterday. HD4.     Patient sitting up in bed, comfortable. Tearful this AM due to missing her children. Patient endorses symptomatic improvement s/p drain placement yesterday. Denies pain. Endorses some nausea, she thinks secondary to the drain fluid. Still endorsing minimal appetite, states she has picked at her trays. Food not appetizing. Denies CP, SOB, fevers, chills. No other complaints.     Objective:     VITAL SIGNS: 24 HR MIN & MAX Most Recent Vitals 24 HR Intake & Output   Temp  Min: 98.2 °F (36.8 °C)  Max: 99.3 °F (37.4 °C)  98.2 °F (36.8 °C)  0701 -  0700  In: -   Out: 1450 [Urine:1450]   BP  Min: 106/65  Max: 132/85  132/85     Pulse  Min: 76  Max: 101  90     Resp  Min: 16  Max: 22  18     SpO2  Min: 95 %  Max: 100 %  100 %       BP  Min: 106/65  Max: 132/85  Temp  Av.5 °F (36.9 °C)  Min: 98.2 °F (36.8 °C)  Max: 99.3 °F (37.4 °C)  Pulse  Av.6  Min: 76  Max: 101  Resp  Av.3  Min: 16  Max: 22  SpO2  Av.6 %  Min: 95 %  Max: 100 %    Body mass index is 22.46 kg/m².    I/O last 3 completed shifts:  In: -   Out: 1450 [Urine:1450]      Intake/Output Summary (Last 24 hours) at 2022 0800  Last data filed at 2022 1800  Gross per 24 hour   Intake --   Output 1450 ml   Net -1450 ml     IR drain output: ~20 cc in drainage bulb      Physical Exam:   Gen: Alert, cooperative, no distress, appears stated age  CV: RRR  Chest: CTABL, no wheezes/rales/rhonchi  Abdomen: Soft, nontender to palpation in lower quadrants. No guarding or rebound. + bowel sounds all four quadrants. 10 Fr drain in place on left side draining purulent fluid.   Extrem: Extremities normal, atraumatic, no cyanosis or edema.  No calf tenderness or erythema.  External genitalia: Normal female genetalia without  lesion. Small amount of yellow/greenish discharge staining surrounding bedsheets.      Labs:   Recent Labs   Lab 09/22/22  0427 09/23/22  0306 09/24/22  0347   WBC 21.7* 21.3* 19.3*   HGB 8.6* 9.1* 9.2*   HCT 27.8* 28.3* 29.0*    268 303   MCV 93.0 92.8 92.7   RDW 14.2 14.4 14.5    140 138   K 3.5 3.6 4.0   CO2 20* 22 20*   BUN 5.0* 3.4* <3.0*   CREATININE 0.65 0.59 0.56   CALCIUM 8.9 8.7 8.8   ALBUMIN 2.5* 2.5* 2.4*   BILITOT 0.7 0.4 0.4   AST 10 13 15   ALT 6 12 10   ALKPHOS 54 82 61     Recent Results (from the past 24 hour(s))   VANCOMYCIN, TROUGH    Collection Time: 09/23/22  9:11 PM   Result Value Ref Range    Vancomycin Trough 8.9 (L) 15.0 - 20.0 ug/ml   Comprehensive metabolic panel (daily)    Collection Time: 09/24/22  3:47 AM   Result Value Ref Range    Sodium Level 138 136 - 145 mmol/L    Potassium Level 4.0 3.5 - 5.1 mmol/L    Chloride 107 98 - 107 mmol/L    Carbon Dioxide 20 (L) 22 - 29 mmol/L    Glucose Level 76 74 - 100 mg/dL    Blood Urea Nitrogen <3.0 (L) 7.0 - 18.7 mg/dL    Creatinine 0.56 0.55 - 1.02 mg/dL    Calcium Level Total 8.8 8.4 - 10.2 mg/dL    Protein Total 5.8 (L) 6.4 - 8.3 gm/dL    Albumin Level 2.4 (L) 3.5 - 5.0 gm/dL    Globulin 3.4 2.4 - 3.5 gm/dL    Albumin/Globulin Ratio 0.7 (L) 1.1 - 2.0 ratio    Bilirubin Total 0.4 <=1.5 mg/dL    Alkaline Phosphatase 61 40 - 150 unit/L    Alanine Aminotransferase 10 0 - 55 unit/L    Aspartate Aminotransferase 15 5 - 34 unit/L    eGFR >60 mls/min/1.73/m2   Magnesium - Daily    Collection Time: 09/24/22  3:47 AM   Result Value Ref Range    Magnesium Level 1.80 1.60 - 2.60 mg/dL   Phosphorus -Daily    Collection Time: 09/24/22  3:47 AM   Result Value Ref Range    Phosphorus Level 2.6 2.3 - 4.7 mg/dL   CBC with Differential    Collection Time: 09/24/22  3:47 AM   Result Value Ref Range    WBC 19.3 (H) 4.5 - 11.5 x10(3)/mcL    RBC 3.13 (L) 4.20 - 5.40 x10(6)/mcL    Hgb 9.2 (L) 12.0 - 16.0 gm/dL    Hct 29.0 (L) 37.0 - 47.0 %    MCV 92.7  80.0 - 94.0 fL    MCH 29.4 27.0 - 31.0 pg    MCHC 31.7 (L) 33.0 - 36.0 mg/dL    RDW 14.5 11.5 - 17.0 %    Platelet 303 130 - 400 x10(3)/mcL    MPV 11.6 (H) 7.4 - 10.4 fL    Neut % 80.4 %    Lymph % 11.1 %    Mono % 6.8 %    Eos % 0.7 %    Basophil % 0.2 %    Lymph # 2.15 0.6 - 4.6 x10(3)/mcL    Neut # 15.5 (H) 2.1 - 9.2 x10(3)/mcL    Mono # 1.32 (H) 0.1 - 1.3 x10(3)/mcL    Eos # 0.14 0 - 0.9 x10(3)/mcL    Baso # 0.04 0 - 0.2 x10(3)/mcL    IG# 0.16 (H) 0 - 0.04 x10(3)/mcL    IG% 0.8 %    NRBC% 0.0 %       Microbiology Data:  No results for input(s): CULTURE, SARSCOV2, INFLUENZAA, INFLUENZAB in the last 168 hours.    Invalid input(s): CURINE, CBLOOD, CULTURERESUL, CRESPWSM, SPECIMENDESCRIPTION, CCSF, GRAMSMEAR, CHLAMDNA, NGONDNA, RSVPCR     Radiology:  CT Abdomen Pelvis with contrast  Result Date: 9/23/2022  FINDINGS:  The limited imaged lung bases are clear.  There are no acute findings solid abdominal organs.  No bowel obstruction or free air.  Mild bladder wall thickening improved compared to prior.  Left adnexal collection image 56 series 2 has increased in size now measuring 81 x 36 mm.  Slightly larger right adnexal cystic area on image 52 series 2.  Trace pelvic free fluid.  Abdominal aorta normal in caliber.  No acute osseous findings.     Impression:  Larger left adnexal collection which remains suspicious for tubo-ovarian abscess.    CT Abdomen Pelvis With Contrast  Result Date: 9/21/2022  EXAMINATION CT ABDOMEN PELVIS WITH CONTRAST CLINICAL HISTORY Abdominal pain, acute, nonlocalized; TECHNIQUE Post-contrast helical-acquisition CT images were obtained and multiplanar reformats accomplished by a CT technologist at a separate workstation, pushed to PACS for physician review. CONTRAST *IV: ISOVUE-370, 100 mL *Enteric: none COMPARISON 7 October 2019 FINDINGS Images were reviewed in soft tissue, lung, and bone windows. Exam quality: adequate for evaluation Lines/tubes: none visualized Cardiopulmonary: Visualized  heart chambers are normal volume.  No acute or focal abnormality of the included lower lung zones.  No significant pericardial or pleural fluid. Hepatobiliary/Pancreas: No acute or focal liver abnormality.  Gallbladder is unremarkable.  No convincing obstructive biliary process.  There is no evidence of expansile pancreatic lesion, ductal dilatation, or peripancreatic inflammatory changes. Spleen: No acute or focal abnormality. Adrenals/: No suspicious adrenal or renal lesion. No radiopaque urolithiasis or evidence of distal obstructive uropathy.  There is diffusely thickened appearance of the urinary bladder wall, with no focal mural irregularity or convincing intraluminal abnormality.  Diffusely thickened and heterogeneous appearance of the cervix is noted, with small amount of air through the vaginal and cervical canal. There are complex cystic bilateral adnexal masses measuring 3 cm x 3 cm on right and 7 cm x 4.7 cm on left.  Neither ovary ovaries is convincingly visualized separate from the structures.  Mild surrounding peripelvic fat stranding and free fluid are also noted. Esophagus/GI tract: The included lower esophagus is unremarkable.  No evidence of gastric outlet or small bowel obstruction. There is appearance of scattered small bowel wall thickening at the left lower abdomen, which may be related to contiguous inflammatory changes due to adjacent adnexal findings mentioned above.  The appendix is unremarkable.  No focal abnormality of the colon is appreciated, although there is somewhat thickened appearance of the rectal wall. Peritoneal/Extraperitoneal Spaces: No free intra-abdominal air or drainable collections are identified.  No focal abnormality of the regional vascular structures is identified.  No pathologic carito enlargement or necrotic process. Musculoskeletal: No acute process or suspicious focal abnormality. IMPRESSION 1. Irregular cystic bilateral adnexal masses with surrounding  inflammatory changes, overall appearance nonspecific by CT assessment.  Differential includes endometriomas or infectious etiology; neoplastic disease is less favored given patient age, but not entirely excluded. 2. Diffusely thickened appearance of the bladder wall may reflect changes of cystitis. 3. Prominent appearance of left lower quadrant small bowel wall and distal rectal wall may be secondary to adjacent adnexal inflammatory changes.  Possibility of acute enteritis/proctitis not excluded. ========== No significant discrepancy identified in relation to the teleradiology preliminary report. RADIATION DOSE Automated tube current modulation, weight-based exposure dosing, and/or iterative reconstruction technique utilized to reach lowest reasonably achievable exposure rate. DLP: 341 mGy*cm Electronically signed by: Rj Carson Date:    09/21/2022 Time:    07:37    US Pelvis Comp with Transvag NON-OB (xpd    Result Date: 9/21/2022  EXAMINATION: US PELVIS COMP WITH TRANSVAG NON-OB (XPD) CLINICAL HISTORY: Pelvic pain; TECHNIQUE: Exam is performed both transabdominally and endovaginally COMPARISON: None FINDINGS: The uterus measures 12.5 x 4.2 x 5.3 cm. The endometrial stripe is 1 cm. The right ovary is only seen transabdominally. It measures 4.1 x 2 x 2.9 cm. There is a cyst present measuring 1.8 x 1.2 x 2 cm. There is flow present. There are nabothian cyst present.  There is a small amount of free fluid in the cul de sac. The left ovary measures 7.3 x 4.6 x 6.2 cm..  This is felt to represent both the ovary and a complex fluid collection adjacent to the ovary.  The 2 are very difficult to separate.  This is most consistent with a hemorrhagic or infected cyst.  An endometrioma cannot be ruled out with certainty.  The flow is hyperemic.     Complex lesion adjacent to the left ovary.  Differential would include hemorrhagic and infected cyst.  Endometrioma cannot be ruled out with certainty.  TOA should be considered.  Electronically signed by: Primo Farrar MD Date:    2022 Time:    08:43      Medications  Home medications  Medications Prior to Admission   Medication Sig Dispense Refill Last Dose    diclofenac (VOLTAREN) 75 MG EC tablet Take 1 tablet (75 mg total) by mouth 2 (two) times daily as needed (Pain). 30 tablet 0     HYDROcodone-acetaminophen (NORCO) 5-325 mg per tablet Take 1 tablet by mouth every 6 (six) hours as needed for Pain. 12 tablet 0     hyoscyamine (LEVSIN/SL) 0.125 mg Subl Place 1 tablet (0.125 mg total) under the tongue every 4 (four) hours as needed (Abdominal cramping/bladder spasms). 30 tablet 0      Current Inpatient medications  Scheduled   ibuprofen  600 mg Oral Q6H    metroNIDAZOLE  500 mg Oral Q12H    piperacillin-tazobactam (ZOSYN) IVPB  4.5 g Intravenous Q8H    polyethylene glycol  17 g Oral Daily    potassium chloride  40 mEq Oral BID    sodium chloride 0.9%  10 mL Intravenous Q6H    vancomycin (VANCOCIN) IVPB  1,000 mg Intravenous Q8H     Prn medications: acetaminophen, dextrose 10%, dextrose 10%, fentaNYL, glucagon (human recombinant), glucose, glucose, LIDOcaine HCL 10 mg/ml (1%), magnesium oxide, magnesium oxide, midazolam, ondansetron, oxyCODONE, oxyCODONE, promethazine, sodium chloride 0.9%, sodium chloride 0.9%, Flushing PICC Protocol **AND** sodium chloride 0.9% **AND** sodium chloride 0.9%, Pharmacy to dose Vancomycin consult **AND** vancomycin - pharmacy to dose    Antibiotics and Day Number of Therapy:  S/p 1 g Rocephin   D3 Vanc   D3 Flagyl  D3 Zosyn     Lines   PIV  10 Fr drain placed with IR 2022    Blood Products:  None     Consults:  Interventional Radiology    Assessment/Plan:       22 y.o.  admitted for IV abx in the setting of suspected PID/TOA, found to have trichomonas now s/p drain placement with IR. HD4    -PID/TOA:   - Most recent temp 100.5 at 1659 on , Tmax 102.2F on  at 0943  - Leukocytosis downtrending 29.1 >21.7>21.3>19.3  - Lactate WNL 0.7  -  S/p 1 g Rocephin, D3 Vanc, Zosyn. D3 Flagyl added given presence of trichomonas   - Continue abx until 48 hrs afebrile  - sepsis protocol ordered. Blood cultures NGTD   - continue IV fluid hydration with LR @ 125ml/hr  - F/u AM CBC    -Anemia:  - 10.9/33.6 >8.6/27.8>9.1/28.3>9.2/29.0  - Suspect dilutional 2/2 IVF  - Pt is w/o vaginal bleeding, abdomen soft, minimally tender to palpation    -Hypomagnesemia  - 1.50 > 2g IV Mg > 2.00 > 1.80     - Hypophosphatemia   - 2.6 >1.8 > 2.6 s/p repletion     Disposition:   Anticipated disposition plan: Home   She is not meeting milestones for discharge.   To discharge once TOA adequately treated   Other anticipated discharge needs: None     Will discuss with staff    Frida Thao MD  OBGYN PGY-2

## 2022-09-24 NOTE — PROGRESS NOTES
MD to bedside for PM rounds. Patient tearful. She desires to go home and says that she feels better. Afebrile. Denies nausea/vomiting. Low appetite. Denies fevers/chills    Vitals:    09/24/22 0730 09/24/22 0742 09/24/22 1158 09/24/22 1534   BP:  132/85 126/80 106/66   Pulse:  90 77 81   Resp:  18 18 18   Temp:  98.2 °F (36.8 °C) 97.6 °F (36.4 °C) 98.2 °F (36.8 °C)   TempSrc:  Oral Oral Oral   SpO2: 100% 100% 100% 100%   Weight:       Height:        EXAM:   Gen: tired appearing, NAD  CV: RRR  Pulm: CTAB  Abd: no TTP, OSCAR drain in place at LLQ  Ext: no evidence SCDs    A/P:   Continue IV abx, follow up fluid cultures  Replete electrolytes PRN to goal Mg/K/Phos 2/3/4  Discussed need for further inpatient stay to ensure proper treatment of PID/TOA. Patient reluctant but amenable to plan.  Vistaril PRN for anxiety     Frida Thao MD MPH  [facility] PGY 2

## 2022-09-25 VITALS
WEIGHT: 115 LBS | RESPIRATION RATE: 18 BRPM | SYSTOLIC BLOOD PRESSURE: 112 MMHG | TEMPERATURE: 98 F | HEIGHT: 60 IN | HEART RATE: 90 BPM | OXYGEN SATURATION: 99 % | DIASTOLIC BLOOD PRESSURE: 76 MMHG | BODY MASS INDEX: 22.58 KG/M2

## 2022-09-25 LAB
ALBUMIN SERPL-MCNC: 2.7 GM/DL (ref 3.5–5)
ALBUMIN/GLOB SERPL: 0.7 RATIO (ref 1.1–2)
ALP SERPL-CCNC: 74 UNIT/L (ref 40–150)
ALT SERPL-CCNC: 8 UNIT/L (ref 0–55)
AST SERPL-CCNC: 12 UNIT/L (ref 5–34)
BASOPHILS # BLD AUTO: 0.05 X10(3)/MCL (ref 0–0.2)
BASOPHILS NFR BLD AUTO: 0.3 %
BILIRUBIN DIRECT+TOT PNL SERPL-MCNC: 0.3 MG/DL
BUN SERPL-MCNC: <3 MG/DL (ref 7–18.7)
CALCIUM SERPL-MCNC: 9.4 MG/DL (ref 8.4–10.2)
CHLORIDE SERPL-SCNC: 105 MMOL/L (ref 98–107)
CO2 SERPL-SCNC: 20 MMOL/L (ref 22–29)
CREAT SERPL-MCNC: 0.57 MG/DL (ref 0.55–1.02)
EOSINOPHIL # BLD AUTO: 0.15 X10(3)/MCL (ref 0–0.9)
EOSINOPHIL NFR BLD AUTO: 1 %
ERYTHROCYTE [DISTWIDTH] IN BLOOD BY AUTOMATED COUNT: 14.5 % (ref 11.5–17)
GFR SERPLBLD CREATININE-BSD FMLA CKD-EPI: >60 MLS/MIN/1.73/M2
GLOBULIN SER-MCNC: 3.8 GM/DL (ref 2.4–3.5)
GLUCOSE SERPL-MCNC: 72 MG/DL (ref 74–100)
GRAM STN SPEC: NORMAL
GRAM STN SPEC: NORMAL
HCT VFR BLD AUTO: 29.4 % (ref 37–47)
HGB BLD-MCNC: 9.2 GM/DL (ref 12–16)
IMM GRANULOCYTES # BLD AUTO: 0.27 X10(3)/MCL (ref 0–0.04)
IMM GRANULOCYTES NFR BLD AUTO: 1.7 %
LYMPHOCYTES # BLD AUTO: 2.3 X10(3)/MCL (ref 0.6–4.6)
LYMPHOCYTES NFR BLD AUTO: 14.6 %
MAGNESIUM SERPL-MCNC: 1.9 MG/DL (ref 1.6–2.6)
MCH RBC QN AUTO: 29.3 PG (ref 27–31)
MCHC RBC AUTO-ENTMCNC: 31.3 MG/DL (ref 33–36)
MCV RBC AUTO: 93.6 FL (ref 80–94)
MONOCYTES # BLD AUTO: 1.55 X10(3)/MCL (ref 0.1–1.3)
MONOCYTES NFR BLD AUTO: 9.9 %
NEUTROPHILS # BLD AUTO: 11.4 X10(3)/MCL (ref 2.1–9.2)
NEUTROPHILS NFR BLD AUTO: 72.5 %
NRBC BLD AUTO-RTO: 0 %
PHOSPHATE SERPL-MCNC: 3.6 MG/DL (ref 2.3–4.7)
PLATELET # BLD AUTO: 354 X10(3)/MCL (ref 130–400)
PMV BLD AUTO: 11.4 FL (ref 7.4–10.4)
POTASSIUM SERPL-SCNC: 3.8 MMOL/L (ref 3.5–5.1)
PROT SERPL-MCNC: 6.5 GM/DL (ref 6.4–8.3)
RBC # BLD AUTO: 3.14 X10(6)/MCL (ref 4.2–5.4)
SODIUM SERPL-SCNC: 139 MMOL/L (ref 136–145)
WBC # SPEC AUTO: 15.7 X10(3)/MCL (ref 4.5–11.5)

## 2022-09-25 PROCEDURE — 94761 N-INVAS EAR/PLS OXIMETRY MLT: CPT

## 2022-09-25 PROCEDURE — 63600175 PHARM REV CODE 636 W HCPCS: Performed by: STUDENT IN AN ORGANIZED HEALTH CARE EDUCATION/TRAINING PROGRAM

## 2022-09-25 PROCEDURE — 80053 COMPREHEN METABOLIC PANEL: CPT | Performed by: STUDENT IN AN ORGANIZED HEALTH CARE EDUCATION/TRAINING PROGRAM

## 2022-09-25 PROCEDURE — A4216 STERILE WATER/SALINE, 10 ML: HCPCS | Performed by: STUDENT IN AN ORGANIZED HEALTH CARE EDUCATION/TRAINING PROGRAM

## 2022-09-25 PROCEDURE — 25000003 PHARM REV CODE 250: Performed by: STUDENT IN AN ORGANIZED HEALTH CARE EDUCATION/TRAINING PROGRAM

## 2022-09-25 PROCEDURE — 83735 ASSAY OF MAGNESIUM: CPT | Performed by: STUDENT IN AN ORGANIZED HEALTH CARE EDUCATION/TRAINING PROGRAM

## 2022-09-25 PROCEDURE — 36415 COLL VENOUS BLD VENIPUNCTURE: CPT | Performed by: STUDENT IN AN ORGANIZED HEALTH CARE EDUCATION/TRAINING PROGRAM

## 2022-09-25 PROCEDURE — 85025 COMPLETE CBC W/AUTO DIFF WBC: CPT | Performed by: STUDENT IN AN ORGANIZED HEALTH CARE EDUCATION/TRAINING PROGRAM

## 2022-09-25 PROCEDURE — 84100 ASSAY OF PHOSPHORUS: CPT | Performed by: STUDENT IN AN ORGANIZED HEALTH CARE EDUCATION/TRAINING PROGRAM

## 2022-09-25 RX ORDER — METRONIDAZOLE 500 MG/1
500 TABLET ORAL EVERY 12 HOURS
Qty: 28 TABLET | Refills: 0 | Status: SHIPPED | OUTPATIENT
Start: 2022-09-25 | End: 2022-10-09

## 2022-09-25 RX ORDER — DOXYCYCLINE 100 MG/1
100 CAPSULE ORAL EVERY 12 HOURS
Qty: 28 CAPSULE | Refills: 0 | Status: SHIPPED | OUTPATIENT
Start: 2022-09-25 | End: 2022-10-09

## 2022-09-25 RX ADMIN — SODIUM CHLORIDE, PRESERVATIVE FREE 10 ML: 5 INJECTION INTRAVENOUS at 12:09

## 2022-09-25 RX ADMIN — PIPERACILLIN AND TAZOBACTAM 4.5 G: 4; .5 INJECTION, POWDER, LYOPHILIZED, FOR SOLUTION INTRAVENOUS; PARENTERAL at 12:09

## 2022-09-25 RX ADMIN — IBUPROFEN 600 MG: 600 TABLET ORAL at 06:09

## 2022-09-25 RX ADMIN — VANCOMYCIN HYDROCHLORIDE 1000 MG: 1 INJECTION, POWDER, LYOPHILIZED, FOR SOLUTION INTRAVENOUS at 06:09

## 2022-09-25 RX ADMIN — PIPERACILLIN AND TAZOBACTAM 4.5 G: 4; .5 INJECTION, POWDER, LYOPHILIZED, FOR SOLUTION INTRAVENOUS; PARENTERAL at 08:09

## 2022-09-25 RX ADMIN — SODIUM CHLORIDE, PRESERVATIVE FREE 10 ML: 5 INJECTION INTRAVENOUS at 06:09

## 2022-09-25 NOTE — DISCHARGE SUMMARY
[facility]  Discharge Summary  Gynecology      Admit Date: 2022    Discharge Date and Time:  2022 8:44 AM    Attending Physician: Jossie Rodrigez MD     Discharge Provider: Frida Thao    Reason for Admission: Pelvic inflammatory disease, tubal ovarian abscess, need for IV antibiotics    Procedures Performed: IR abscess drainage and drain placemement     Hospital Course (synopsis of major diagnoses, care, treatment, and services provided during the course of the hospital stay): 23 yo  presented on 2022 with bilateral adnexal tenderness, malodorous discharge and nausea/vomiting and abdominal pain. Leukocytosis was noted on lab work and imaging was concerning for 7 cm left adnexal mass possibly a TOA. She was admitted for IV antibiotics with vancomycin and zosyn. She was placed on sepsis protocol and IR was consulted for possible drainage. On hospital day 2, patient's lab work was notable for positive trichomonas on wet prep. Flagyl was added to her antibiotic course. On hospital day 3, IR proceeded with abscess drainage and drain placement. On HD 4 and 5, patient's clinical status improved. She remained afebrile. Her pain was well controlled and she was meeting all milestones for discharge. She was discharged with a 2 week course of PO antibiotics doxycycline and flagyl. She was scheduled for follow up in 1 weeks time. Strict return precautions were provided.     Consults: Interventional radiolgy    Significant Diagnostic Studies: Labs: CMP   Recent Labs   Lab 22  0347 22  0311    139   K 4.0 3.8   CO2 20* 20*   BUN <3.0* <3.0*   CREATININE 0.56 0.57   CALCIUM 8.8 9.4   ALBUMIN 2.4* 2.7*   BILITOT 0.4 0.3   ALKPHOS 61 74   AST 15 12   ALT 10 8    and CBC   Recent Labs   Lab 22  0347 22  0311   WBC 19.3* 15.7*   HGB 9.2* 9.2*   HCT 29.0* 29.4*    354     Microbiology: Blood Culture NG and Wound Culture: pending final      Imaging CT abdomen pelvis:   9/21:   IMPRESSION  1. Irregular cystic bilateral adnexal masses with surrounding inflammatory changes, overall appearance nonspecific by CT assessment.  Differential includes endometriomas or infectious etiology; neoplastic disease is less favored given patient age, but not entirely excluded.  2. Diffusely thickened appearance of the bladder wall may reflect changes of cystitis.  3. Prominent appearance of left lower quadrant small bowel wall and distal rectal wall may be secondary to adjacent adnexal inflammatory changes.  Possibility of acute enteritis/proctitis not excluded.    9/23:  Impression:  Larger left adnexal collection which remains suspicious for tubo-ovarian abscess.       Final Diagnoses:   Principal Problem: Pelvic inflammatory disease (PID)   Secondary Diagnoses:  Tubo-ovarian abscess, trichomonas    Discharged Condition: good    Disposition: Home or Self Care    Follow Up/Patient Instructions: To follow up Friday 9/30. Appointment requested    Medications:  Reconciled Home Medications:      Medication List        START taking these medications      doxycycline 100 MG capsule  Commonly known as: MONODOX  Take 1 capsule (100 mg total) by mouth every 12 (twelve) hours. for 14 days     metroNIDAZOLE 500 MG tablet  Commonly known as: FLAGYL  Take 1 tablet (500 mg total) by mouth every 12 (twelve) hours. for 14 days            CONTINUE taking these medications      diclofenac 75 MG EC tablet  Commonly known as: VOLTAREN  Take 1 tablet (75 mg total) by mouth 2 (two) times daily as needed (Pain).     HYDROcodone-acetaminophen 5-325 mg per tablet  Commonly known as: NORCO  Take 1 tablet by mouth every 6 (six) hours as needed for Pain.     hyoscyamine 0.125 mg Subl  Commonly known as: LEVSIN/SL  Place 1 tablet (0.125 mg total) under the tongue every 4 (four) hours as needed (Abdominal cramping/bladder spasms).            No discharge procedures on file.    Frida Thao MD    OBGYN PGY 2

## 2022-09-26 LAB
BACTERIA BLD CULT: NORMAL

## 2022-09-27 LAB
BACTERIA SPEC ANAEROBE CULT: NORMAL
BACTERIA SPEC ANAEROBE CULT: NORMAL
BACTERIA SPEC CULT: NO GROWTH

## 2022-09-29 LAB — BACTERIA FLD CULT: NORMAL

## 2022-09-29 NOTE — PHYSICIAN QUERY
PT Name: Beatriz Arceo  MR #: [id number]     DOCUMENTATION CLARIFICATION      CDS/: ATIYA Santiago,RNC-[email or web address]        Contact information:[email or web address]  This form is a permanent document in the medical record.    Query Date: September 29, 2022    By submitting this query, we are merely seeking further clarification of documentation to reflect the severity of illness of your patient. Please utilize your independent clinical judgment when addressing the question(s) below.     The Medical Record contains the following:   Indicators   Supporting Clinical Findings Location in Medical Record   X Documentation/History of condition Pelvic inflammatory disease, tubal ovarian abscess Discharge Summary 9/25   X Lab Value(s) Leukocytosis was noted on lab work    patient's lab work was notable for positive trichomonas on wet prep Discharge Summary 9/25   X Radiology Findings imaging was concerning for 7 cm left adnexal mass possibly a TOA Discharge Summary 9/25   X Treatment/Medication She was admitted for IV antibiotics with vancomycin and zosyn. She was placed on sepsis protocol and IR was consulted for possible drainage.  Discharge Summary 9/25   X Other presented on 9/21/2022 with bilateral adnexal tenderness, malodorous discharge and nausea/vomiting and abdominal pain Discharge Summary 9/25      Provider, please specify the acuity/chronicity of Pelvic inflammatory disease, tubal ovarian abscess:    [X   ] Acute   [   ] Other (please specify): _______________   [   ] Clinically Undetermined         Please document in your progress notes daily for the duration of treatment until resolved, and include in your discharge summary.  Form No. [id number]

## 2022-09-30 ENCOUNTER — OFFICE VISIT (OUTPATIENT)
Dept: GYNECOLOGY | Facility: CLINIC | Age: 22
End: 2022-09-30
Payer: MEDICAID

## 2022-09-30 VITALS
WEIGHT: 125.63 LBS | BODY MASS INDEX: 24.53 KG/M2 | OXYGEN SATURATION: 100 % | TEMPERATURE: 98 F | HEART RATE: 79 BPM | DIASTOLIC BLOOD PRESSURE: 72 MMHG | SYSTOLIC BLOOD PRESSURE: 112 MMHG

## 2022-09-30 DIAGNOSIS — Z98.890 HISTORY OF DRAINAGE OF ABSCESS: ICD-10-CM

## 2022-09-30 DIAGNOSIS — A59.9 TRICHOMONIASIS: ICD-10-CM

## 2022-09-30 DIAGNOSIS — N73.9 PELVIC INFLAMMATORY DISEASE (PID): Primary | ICD-10-CM

## 2022-09-30 PROCEDURE — 99213 OFFICE O/P EST LOW 20 MIN: CPT | Mod: PBBFAC

## 2022-09-30 NOTE — LETTER
"Beatriz "David Arceo was seen in our clinic9/30/2022. She was admitted for treatment 9/21/22. Please excuse from work through these dates.  She may return to work on 9/31/22.      If you have any questions or concerns, please don't hesitate to call.          Nichol Quick M.D.  [facility] HO-2    "

## 2022-09-30 NOTE — PROGRESS NOTES
[facility]    Subjective:      Post-hospital Follow-up     Beatriz Arceo is a 22 y.o.  who presents to the clinic 1 week after admission for PID/TOA. Pt admitted 22. Received Zosyn, Vancomycin, and flagyl while admitted. IR drain placed on 22. Pt discharged with PO doxycycline and Flagyl x 2 weeks. She reports compliance with Abx therapy. Denies abdominal pain, vaginal discharge, and changes in urinary/BM.    Patient's medications, allergies, past medical, surgical, social and family histories were reviewed and updated as appropriate.      Review of Systems  Pertinent items are noted in HPI.       Objective:      /72   Pulse 79   Temp 97.9 °F (36.6 °C) (Oral)   Wt 57 kg (125 lb 9.6 oz)   SpO2 100%   BMI 24.53 kg/m²     General: in no acute distress  RESP: clear to auscultation bilaterally, non labored  CV: regular rate and rhythm, no murmurs, no edema  ABD: soft, nontender, BS+, drain site intact and healed    9 d ago      Chlamydia trachomatis PCR Not Detected Not Detected    N. gonorrhea PCR Not Detected Not Detected       9 d ago 2 yr ago 4 yr ago    WBC, Wet Prep None Seen Many Abnormal   None R  None R    Clue Cells, Wet Prep None Seen None Seen  None R  Occasional Abnormal  R    Trichomonas, Wet Prep None Seen Few Abnormal       Yeast, Wet Prep None Seen None Seen        Anaerobic Culture No Anaerobes Isolated         Assessment:       22 y.o.  s/p IR drain TOA admitted 22 with PID/TOA. doing well overall.    1. Pelvic inflammatory disease (PID)        2. History of drainage of abscess        3. Trichomoniasis                Plan:        Inpatient results reviewed  Continue Doxy/flagyl to complete 2 week course  Partner treatment discussed  RTC annual wellness if needed to est      Problem List Items Addressed This Visit          Renal/    Pelvic inflammatory disease (PID) - Primary     Other Visit Diagnoses       History of drainage of abscess         Trichomoniasis                Nichol Quick M.D.  [facility] HO-2

## 2022-10-01 LAB
POC PTINR: 1.3 (ref 0.9–1.2)
POC PTWBT: 15 SEC (ref 9.7–14.3)
SAMPLE: ABNORMAL

## 2023-06-20 ENCOUNTER — PATIENT MESSAGE (OUTPATIENT)
Dept: RESEARCH | Facility: HOSPITAL | Age: 23
End: 2023-06-20
Payer: MEDICAID